# Patient Record
Sex: FEMALE | Race: WHITE | NOT HISPANIC OR LATINO | Employment: OTHER | URBAN - METROPOLITAN AREA
[De-identification: names, ages, dates, MRNs, and addresses within clinical notes are randomized per-mention and may not be internally consistent; named-entity substitution may affect disease eponyms.]

---

## 2017-12-01 ENCOUNTER — RESOLUTE PROFESSIONAL BILLING HOSPITAL PROF FEE (OUTPATIENT)
Dept: HOSPITALIST | Facility: MEDICAL CENTER | Age: 72
End: 2017-12-01
Payer: MEDICARE

## 2017-12-01 ENCOUNTER — HOSPITAL ENCOUNTER (OUTPATIENT)
Facility: MEDICAL CENTER | Age: 72
End: 2017-12-02
Attending: EMERGENCY MEDICINE | Admitting: HOSPITALIST
Payer: MEDICARE

## 2017-12-01 ENCOUNTER — APPOINTMENT (OUTPATIENT)
Dept: RADIOLOGY | Facility: MEDICAL CENTER | Age: 72
End: 2017-12-01
Attending: EMERGENCY MEDICINE
Payer: MEDICARE

## 2017-12-01 DIAGNOSIS — G45.8 OTHER SPECIFIED TRANSIENT CEREBRAL ISCHEMIAS: ICD-10-CM

## 2017-12-01 PROBLEM — G43.909 MIGRAINE: Status: ACTIVE | Noted: 2017-12-01

## 2017-12-01 PROBLEM — G45.9 TIA (TRANSIENT ISCHEMIC ATTACK): Status: ACTIVE | Noted: 2017-12-01

## 2017-12-01 LAB
ANION GAP SERPL CALC-SCNC: 10 MMOL/L (ref 0–11.9)
APTT PPP: 25 SEC (ref 24.7–36)
BASOPHILS # BLD AUTO: 0.6 % (ref 0–1.8)
BASOPHILS # BLD: 0.04 K/UL (ref 0–0.12)
BUN SERPL-MCNC: 19 MG/DL (ref 8–22)
CALCIUM SERPL-MCNC: 9.6 MG/DL (ref 8.4–10.2)
CHLORIDE SERPL-SCNC: 103 MMOL/L (ref 96–112)
CO2 SERPL-SCNC: 24 MMOL/L (ref 20–33)
CREAT SERPL-MCNC: 0.85 MG/DL (ref 0.5–1.4)
EKG IMPRESSION: NORMAL
EOSINOPHIL # BLD AUTO: 0.17 K/UL (ref 0–0.51)
EOSINOPHIL NFR BLD: 2.5 % (ref 0–6.9)
ERYTHROCYTE [DISTWIDTH] IN BLOOD BY AUTOMATED COUNT: 43.4 FL (ref 35.9–50)
ERYTHROCYTE [SEDIMENTATION RATE] IN BLOOD BY WESTERGREN METHOD: 3 MM/HOUR (ref 0–30)
EST. AVERAGE GLUCOSE BLD GHB EST-MCNC: 114 MG/DL
GFR SERPL CREATININE-BSD FRML MDRD: >60 ML/MIN/1.73 M 2
GLUCOSE SERPL-MCNC: 104 MG/DL (ref 65–99)
HBA1C MFR BLD: 5.6 % (ref 0–5.6)
HCT VFR BLD AUTO: 42.3 % (ref 37–47)
HGB BLD-MCNC: 14.3 G/DL (ref 12–16)
IMM GRANULOCYTES # BLD AUTO: 0.02 K/UL (ref 0–0.11)
IMM GRANULOCYTES NFR BLD AUTO: 0.3 % (ref 0–0.9)
INR PPP: 0.97 (ref 0.87–1.13)
LYMPHOCYTES # BLD AUTO: 2.28 K/UL (ref 1–4.8)
LYMPHOCYTES NFR BLD: 33 % (ref 22–41)
MCH RBC QN AUTO: 31.8 PG (ref 27–33)
MCHC RBC AUTO-ENTMCNC: 33.8 G/DL (ref 33.6–35)
MCV RBC AUTO: 94 FL (ref 81.4–97.8)
MONOCYTES # BLD AUTO: 0.6 K/UL (ref 0–0.85)
MONOCYTES NFR BLD AUTO: 8.7 % (ref 0–13.4)
NEUTROPHILS # BLD AUTO: 3.79 K/UL (ref 2–7.15)
NEUTROPHILS NFR BLD: 54.9 % (ref 44–72)
NRBC # BLD AUTO: 0 K/UL
NRBC BLD AUTO-RTO: 0 /100 WBC
PLATELET # BLD AUTO: 162 K/UL (ref 164–446)
PMV BLD AUTO: 10.6 FL (ref 9–12.9)
POTASSIUM SERPL-SCNC: 3.5 MMOL/L (ref 3.6–5.5)
PROTHROMBIN TIME: 12.5 SEC (ref 12–14.6)
RBC # BLD AUTO: 4.5 M/UL (ref 4.2–5.4)
SODIUM SERPL-SCNC: 137 MMOL/L (ref 135–145)
TSH SERPL DL<=0.005 MIU/L-ACNC: 1.95 UIU/ML (ref 0.35–5.5)
WBC # BLD AUTO: 6.9 K/UL (ref 4.8–10.8)

## 2017-12-01 PROCEDURE — 85610 PROTHROMBIN TIME: CPT

## 2017-12-01 PROCEDURE — 70450 CT HEAD/BRAIN W/O DYE: CPT

## 2017-12-01 PROCEDURE — 80048 BASIC METABOLIC PNL TOTAL CA: CPT

## 2017-12-01 PROCEDURE — 99218 PR INITIAL OBSERVATION CARE,LEVL I: CPT | Performed by: HOSPITALIST

## 2017-12-01 PROCEDURE — 85025 COMPLETE CBC W/AUTO DIFF WBC: CPT

## 2017-12-01 PROCEDURE — 85652 RBC SED RATE AUTOMATED: CPT

## 2017-12-01 PROCEDURE — 93005 ELECTROCARDIOGRAM TRACING: CPT

## 2017-12-01 PROCEDURE — 84443 ASSAY THYROID STIM HORMONE: CPT

## 2017-12-01 PROCEDURE — 85730 THROMBOPLASTIN TIME PARTIAL: CPT

## 2017-12-01 PROCEDURE — 700102 HCHG RX REV CODE 250 W/ 637 OVERRIDE(OP): Performed by: HOSPITALIST

## 2017-12-01 PROCEDURE — G0378 HOSPITAL OBSERVATION PER HR: HCPCS

## 2017-12-01 PROCEDURE — A9270 NON-COVERED ITEM OR SERVICE: HCPCS | Performed by: HOSPITALIST

## 2017-12-01 PROCEDURE — 36415 COLL VENOUS BLD VENIPUNCTURE: CPT

## 2017-12-01 PROCEDURE — 99285 EMERGENCY DEPT VISIT HI MDM: CPT

## 2017-12-01 PROCEDURE — 700111 HCHG RX REV CODE 636 W/ 250 OVERRIDE (IP): Performed by: HOSPITALIST

## 2017-12-01 PROCEDURE — 83036 HEMOGLOBIN GLYCOSYLATED A1C: CPT

## 2017-12-01 PROCEDURE — 700105 HCHG RX REV CODE 258: Performed by: HOSPITALIST

## 2017-12-01 RX ORDER — OXYCODONE HYDROCHLORIDE 5 MG/1
2.5 TABLET ORAL
Status: DISCONTINUED | OUTPATIENT
Start: 2017-12-01 | End: 2017-12-02 | Stop reason: HOSPADM

## 2017-12-01 RX ORDER — ONDANSETRON 4 MG/1
4 TABLET, ORALLY DISINTEGRATING ORAL EVERY 4 HOURS PRN
Status: DISCONTINUED | OUTPATIENT
Start: 2017-12-01 | End: 2017-12-02 | Stop reason: HOSPADM

## 2017-12-01 RX ORDER — LEVOTHYROXINE SODIUM 0.12 MG/1
125 TABLET ORAL
Status: DISCONTINUED | OUTPATIENT
Start: 2017-12-02 | End: 2017-12-02 | Stop reason: HOSPADM

## 2017-12-01 RX ORDER — ASPIRIN 81 MG/1
324 TABLET, CHEWABLE ORAL DAILY
Status: DISCONTINUED | OUTPATIENT
Start: 2017-12-01 | End: 2017-12-02 | Stop reason: HOSPADM

## 2017-12-01 RX ORDER — POLYETHYLENE GLYCOL 3350 17 G/17G
1 POWDER, FOR SOLUTION ORAL
Status: DISCONTINUED | OUTPATIENT
Start: 2017-12-01 | End: 2017-12-02 | Stop reason: HOSPADM

## 2017-12-01 RX ORDER — SUMATRIPTAN 25 MG/1
100 TABLET, FILM COATED ORAL
Status: COMPLETED | OUTPATIENT
Start: 2017-12-01 | End: 2017-12-02

## 2017-12-01 RX ORDER — BISACODYL 10 MG
10 SUPPOSITORY, RECTAL RECTAL
Status: DISCONTINUED | OUTPATIENT
Start: 2017-12-01 | End: 2017-12-02 | Stop reason: HOSPADM

## 2017-12-01 RX ORDER — ASPIRIN 600 MG/1
300 SUPPOSITORY RECTAL DAILY
Status: DISCONTINUED | OUTPATIENT
Start: 2017-12-01 | End: 2017-12-02 | Stop reason: HOSPADM

## 2017-12-01 RX ORDER — AMOXICILLIN 250 MG
2 CAPSULE ORAL 2 TIMES DAILY
Status: DISCONTINUED | OUTPATIENT
Start: 2017-12-01 | End: 2017-12-02 | Stop reason: HOSPADM

## 2017-12-01 RX ORDER — OXYCODONE HYDROCHLORIDE 5 MG/1
5 TABLET ORAL
Status: DISCONTINUED | OUTPATIENT
Start: 2017-12-01 | End: 2017-12-02 | Stop reason: HOSPADM

## 2017-12-01 RX ORDER — LEVOTHYROXINE SODIUM 0.12 MG/1
125 TABLET ORAL
COMMUNITY

## 2017-12-01 RX ORDER — ONDANSETRON 2 MG/ML
4 INJECTION INTRAMUSCULAR; INTRAVENOUS EVERY 4 HOURS PRN
Status: DISCONTINUED | OUTPATIENT
Start: 2017-12-01 | End: 2017-12-02 | Stop reason: HOSPADM

## 2017-12-01 RX ORDER — ACETAMINOPHEN 325 MG/1
650 TABLET ORAL EVERY 6 HOURS PRN
Status: DISCONTINUED | OUTPATIENT
Start: 2017-12-01 | End: 2017-12-02 | Stop reason: HOSPADM

## 2017-12-01 RX ORDER — SODIUM CHLORIDE 9 MG/ML
INJECTION, SOLUTION INTRAVENOUS CONTINUOUS
Status: DISCONTINUED | OUTPATIENT
Start: 2017-12-01 | End: 2017-12-02 | Stop reason: HOSPADM

## 2017-12-01 RX ORDER — ASPIRIN 325 MG
325 TABLET ORAL DAILY
Status: DISCONTINUED | OUTPATIENT
Start: 2017-12-01 | End: 2017-12-02 | Stop reason: HOSPADM

## 2017-12-01 RX ADMIN — ASPIRIN 325 MG ORAL TABLET 325 MG: 325 PILL ORAL at 18:25

## 2017-12-01 RX ADMIN — SODIUM CHLORIDE: 9 INJECTION, SOLUTION INTRAVENOUS at 18:26

## 2017-12-01 RX ADMIN — DOCUSATE SODIUM AND SENNOSIDES 2 TABLET: 8.6; 5 TABLET, FILM COATED ORAL at 20:18

## 2017-12-01 RX ADMIN — ENOXAPARIN SODIUM 40 MG: 100 INJECTION SUBCUTANEOUS at 18:25

## 2017-12-01 ASSESSMENT — PATIENT HEALTH QUESTIONNAIRE - PHQ9
SUM OF ALL RESPONSES TO PHQ QUESTIONS 1-9: 0
SUM OF ALL RESPONSES TO PHQ9 QUESTIONS 1 AND 2: 0
1. LITTLE INTEREST OR PLEASURE IN DOING THINGS: NOT AT ALL
2. FEELING DOWN, DEPRESSED, IRRITABLE, OR HOPELESS: NOT AT ALL

## 2017-12-01 ASSESSMENT — LIFESTYLE VARIABLES
ALCOHOL_USE: NO
EVER_SMOKED: NEVER

## 2017-12-01 ASSESSMENT — PAIN SCALES - GENERAL
PAINLEVEL_OUTOF10: 0
PAINLEVEL_OUTOF10: 0

## 2017-12-01 NOTE — ED PROVIDER NOTES
ED Provider Note    CHIEF COMPLAINT  Chief Complaint   Patient presents with   • Blurred Vision     X2 within last 30 min       HPI  Jovita Chaves is a 72 y.o. female who presents complaining of difficulty with her vision. This has happened twice today. Each episode lasted less than 10 minutes. Revision was doubled and altered. She denied headache. She does have a history of migraines but no complex migraines. She denies difficulty with her speech, ambulation, her arm and leg weakness.    REVIEW OF SYSTEMS  See HPI for further details. No fever or chills. No headache. No neck pain. All other systems are negative.    PAST MEDICAL HISTORY  Past Medical History:   Diagnosis Date   • Arthritis     lower back   • Cervicalgia    • Hypothyroid    • Migraine without aura, with intractable migraine, so stated, without mention of status migrainosus    • Obesity    • Pain     lower back 8/10   • Thoracic or lumbosacral neuritis or radiculitis, unspecified     Bilateral L4/5   • Unspecified disorder of thyroid        FAMILY HISTORY  Family History   Problem Relation Age of Onset   • Cancer Mother      Colon   • Other Father      Migraine   • Thyroid Mother      Hypothyroid     No history of stroke      SOCIAL HISTORY  Social History     Social History   • Marital status:      Spouse name: N/A   • Number of children: N/A   • Years of education: N/A     Social History Main Topics   • Smoking status: Never Smoker   • Smokeless tobacco: Not on file   • Alcohol use No   • Drug use: No   • Sexual activity: Not on file     Other Topics Concern   • Not on file     Social History Narrative   • No narrative on file       SURGICAL HISTORY  Past Surgical History:   Procedure Laterality Date   • IRRIGATION & DEBRIDEMENT NEURO  11/8/2010    Performed by JANET FELIX at SURGERY [facility] ORS   • LUMBAR LAMINECTOMY DISKECTOMY  9/27/2010    Performed by JANET FELIX at SURGERY [facility] ORS   • LAMINOTOMY  9/27/2010  "   Performed by JANET FELIX at SURGERY [facility] ORS   • OTHER  2010    epidural injections   • OTHER  2009    left ankle debridement   • OTHER  2006    right foot gusman osteotomy   • OTHER  2005    tendon transfer right foot   • CHOLECYSTECTOMY  1983   • CHOLECYSTECTOMY  1983   • OTHER  1982    left knee repair   • OTHER  1951    hysterectomy   • OTHER ORTHOPEDIC SURGERY      Left knee, right foot tendon, Left ankle debridement   • ROTATOR CUFF REPAIR  [id number]    rotator cuff, brachioplexus nerve       CURRENT MEDICATIONS  Home Medications     Reviewed by Jacqueline Ortega (Pharmacy Tech) on 12/01/17 at 1543  Med List Status: Complete   Medication Last Dose Status   aspirin EC (ECOTRIN) 81 MG Tablet Delayed Response 12/1/2017 Active   Cholecalciferol (VITAMIN D3) 5000 units Cap 12/1/2017 Active   Cyanocobalamin ([employer] VITAMIN B12) 2000 MCG Tab 12/1/2017 Active   levothyroxine (SYNTHROID) 125 MCG Tab 12/1/2017 Active   Omega-3 Fatty Acids (RA FISH OIL) 1400 MG CAPSULE DELAYED RELEASE 12/1/2017 Active   sumatriptan (IMITREX) 100 MG tablet 11/30/2017 Active                ALLERGIES  No Known Allergies    PHYSICAL EXAM  VITAL SIGNS: /75   Pulse (!) 103   Temp 36.2 °C (97.2 °F)   Resp 18   Ht 1.727 m (5' 8\")   Wt 105.4 kg (232 lb 5.8 oz)   SpO2 96%   BMI 35.33 kg/m²   Constitutional: Well developed, Well nourished, No acute distress, Non-toxic appearance.   HENT:Number Stock atraumatic. Neck is supple. No bruits  Eyes: Pupils equal reactive to light extraocular movements are intact  Neck: Normal range of motion, No tenderness, Supple, No stridor.   Cardiovascular: Normal heart rate, Normal rhythm, No murmurs, No rubs, No gallops.   Thorax & Lungs: Normal breath sounds, No respiratory distress, No wheezing, No chest tenderness.   Abdomen: Bowel sounds normal, Soft, No tenderness, No masses, No pulsatile masses.   Skin: Warm, Dry, No erythema, No rash.   Back: No tenderness, No CVA tenderness. "   Extremities: No edema, No tenderness, No cyanosis, No clubbing. Dorsalis pedis pulses 2+ equal bilaterally. Radial pulses 2+ equal bilaterally   Neurologic: Cranial nerves II-12 intact. Normal strength and sensation. Normal gait. Deep tendon reflexes are intact  Psychiatric: Affect normal, Judgment normal, Mood normal.     EKG  Sinus rhythm. Rate of 95. Normal P waves. Abnormal QRS with a incomplete right bundle branch block and a left anterior fascicular block. Normal ST segments. Normal T waves. Abnormal EKG showing a bifascicular block.    RADIOLOGY/PROCEDURES  CT-HEAD W/O   Final Result         1. No acute intracranial abnormality. No evidence of acute intracranial hemorrhage or mass lesion.                     Results for orders placed or performed during the hospital encounter of 12/01/17   CBC WITH DIFFERENTIAL   Result Value Ref Range    WBC 6.9 4.8 - 10.8 K/uL    RBC 4.50 4.20 - 5.40 M/uL    Hemoglobin 14.3 12.0 - 16.0 g/dL    Hematocrit 42.3 37.0 - 47.0 %    MCV 94.0 81.4 - 97.8 fL    MCH 31.8 27.0 - 33.0 pg    MCHC 33.8 33.6 - 35.0 g/dL    RDW 43.4 35.9 - 50.0 fL    Platelet Count 162 (L) 164 - 446 K/uL    MPV 10.6 9.0 - 12.9 fL    Neutrophils-Polys 54.90 44.00 - 72.00 %    Lymphocytes 33.00 22.00 - 41.00 %    Monocytes 8.70 0.00 - 13.40 %    Eosinophils 2.50 0.00 - 6.90 %    Basophils 0.60 0.00 - 1.80 %    Immature Granulocytes 0.30 0.00 - 0.90 %    Nucleated RBC 0.00 /100 WBC    Neutrophils (Absolute) 3.79 2.00 - 7.15 K/uL    Lymphs (Absolute) 2.28 1.00 - 4.80 K/uL    Monos (Absolute) 0.60 0.00 - 0.85 K/uL    Eos (Absolute) 0.17 0.00 - 0.51 K/uL    Baso (Absolute) 0.04 0.00 - 0.12 K/uL    Immature Granulocytes (abs) 0.02 0.00 - 0.11 K/uL    NRBC (Absolute) 0.00 K/uL   BASIC METABOLIC PANEL   Result Value Ref Range    Sodium 137 135 - 145 mmol/L    Potassium 3.5 (L) 3.6 - 5.5 mmol/L    Chloride 103 96 - 112 mmol/L    Co2 24 20 - 33 mmol/L    Glucose 104 (H) 65 - 99 mg/dL    Bun 19 8 - 22 mg/dL     Creatinine 0.85 0.50 - 1.40 mg/dL    Calcium 9.6 8.4 - 10.2 mg/dL    Anion Gap 10.0 0.0 - 11.9   PROTHROMBIN TIME   Result Value Ref Range    PT 12.5 12.0 - 14.6 sec    INR 0.97 0.87 - 1.13   APTT   Result Value Ref Range    APTT 25.0 24.7 - 36.0 sec   ESTIMATED GFR   Result Value Ref Range    GFR If African American >60 >60 mL/min/1.73 m 2    GFR If Non African American >60 >60 mL/min/1.73 m 2   EKG (NOW)   Result Value Ref Range    Report       [facility] Emergency Dept.    Test Date:  2017  Pt Name:    JOSSY PARTIDA             Department: [facility]  MRN:        [id number]                      [facility]  Gender:     F                            Technician: SPENCER  :        [id number]                   Requested By:ER TRIAGE PROTOCOL  Order #:    [id number]                    Reading MD:    Measurements  Intervals                                Axis  Rate:       96                           P:          68  AR:         200                          QRS:        -51  QRSD:       82                           T:          50  QT:         368  QTc:        465    Interpretive Statements  SINUS RHYTHM  LEFT ANTERIOR FASCICULAR BLOCK  RSR' IN V1 OR V2, RIGHT VCD OR RVH  Compared to ECG 2010 13:38:39  Left anterior fascicular block now present           COURSE & MEDICAL DECISION MAKING  Pertinent Labs & Imaging studies reviewed. (See chart for details)  Differential diagnosis includes complex migraines versus TIA versus brain tumor. Patient is currently asymptomatic. CT scan was unremarkable for hemorrhagic stroke. I am concerned the patient could have a posterior circulation TIA.    FINAL IMPRESSION  1. TIA  2.   3.          Electronically signed by: Tomer Marti, 2017 3:53 PM

## 2017-12-01 NOTE — ED NOTES
"Patient reports episode of blurred vision X2 within last 30 minutes, currently denies blurred vision. Patient reports \"fuzziness in head\" and \"something is wrong\". EKG called in triage.   "

## 2017-12-02 ENCOUNTER — APPOINTMENT (OUTPATIENT)
Dept: RADIOLOGY | Facility: MEDICAL CENTER | Age: 72
End: 2017-12-02
Attending: HOSPITALIST
Payer: MEDICARE

## 2017-12-02 VITALS
TEMPERATURE: 98 F | HEART RATE: 93 BPM | WEIGHT: 232.37 LBS | RESPIRATION RATE: 16 BRPM | HEIGHT: 68 IN | DIASTOLIC BLOOD PRESSURE: 66 MMHG | BODY MASS INDEX: 35.22 KG/M2 | SYSTOLIC BLOOD PRESSURE: 131 MMHG | OXYGEN SATURATION: 100 %

## 2017-12-02 LAB
CHOLEST SERPL-MCNC: 220 MG/DL (ref 100–199)
HDLC SERPL-MCNC: 67 MG/DL
LDLC SERPL CALC-MCNC: 135 MG/DL
LV EJECT FRACT  99904: 60
LV EJECT FRACT MOD 2C 99903: 52.94
LV EJECT FRACT MOD 4C 99902: 69.29
LV EJECT FRACT MOD BP 99901: 59
TRIGL SERPL-MCNC: 88 MG/DL (ref 0–149)

## 2017-12-02 PROCEDURE — 80061 LIPID PANEL: CPT

## 2017-12-02 PROCEDURE — 700111 HCHG RX REV CODE 636 W/ 250 OVERRIDE (IP): Performed by: HOSPITALIST

## 2017-12-02 PROCEDURE — 99217 PR OBSERVATION CARE DISCHARGE: CPT | Performed by: HOSPITALIST

## 2017-12-02 PROCEDURE — 97161 PT EVAL LOW COMPLEX 20 MIN: CPT

## 2017-12-02 PROCEDURE — G8988 SELF CARE GOAL STATUS: HCPCS | Mod: CH

## 2017-12-02 PROCEDURE — G8979 MOBILITY GOAL STATUS: HCPCS | Mod: CH

## 2017-12-02 PROCEDURE — 93880 EXTRACRANIAL BILAT STUDY: CPT

## 2017-12-02 PROCEDURE — 70551 MRI BRAIN STEM W/O DYE: CPT

## 2017-12-02 PROCEDURE — A9270 NON-COVERED ITEM OR SERVICE: HCPCS | Performed by: HOSPITALIST

## 2017-12-02 PROCEDURE — 99285 EMERGENCY DEPT VISIT HI MDM: CPT

## 2017-12-02 PROCEDURE — G0378 HOSPITAL OBSERVATION PER HR: HCPCS

## 2017-12-02 PROCEDURE — 700102 HCHG RX REV CODE 250 W/ 637 OVERRIDE(OP): Performed by: HOSPITALIST

## 2017-12-02 PROCEDURE — 93306 TTE W/DOPPLER COMPLETE: CPT

## 2017-12-02 PROCEDURE — G8980 MOBILITY D/C STATUS: HCPCS | Mod: CH

## 2017-12-02 PROCEDURE — 93306 TTE W/DOPPLER COMPLETE: CPT | Mod: 26 | Performed by: INTERNAL MEDICINE

## 2017-12-02 PROCEDURE — G8989 SELF CARE D/C STATUS: HCPCS | Mod: CH

## 2017-12-02 PROCEDURE — G8987 SELF CARE CURRENT STATUS: HCPCS | Mod: CH

## 2017-12-02 PROCEDURE — G8978 MOBILITY CURRENT STATUS: HCPCS | Mod: CH

## 2017-12-02 RX ORDER — ASPIRIN 325 MG
325 TABLET, DELAYED RELEASE (ENTERIC COATED) ORAL DAILY
Qty: 100 TAB | Refills: 3 | Status: SHIPPED | OUTPATIENT
Start: 2017-12-02 | End: 2021-05-24

## 2017-12-02 RX ORDER — CHLORAL HYDRATE 500 MG
1000 CAPSULE ORAL
Status: DISCONTINUED | OUTPATIENT
Start: 2017-12-02 | End: 2017-12-02 | Stop reason: HOSPADM

## 2017-12-02 RX ORDER — ATORVASTATIN CALCIUM 40 MG/1
40 TABLET, FILM COATED ORAL
Status: DISCONTINUED | OUTPATIENT
Start: 2017-12-02 | End: 2017-12-02 | Stop reason: HOSPADM

## 2017-12-02 RX ORDER — POTASSIUM CHLORIDE 20 MEQ/1
20 TABLET, EXTENDED RELEASE ORAL DAILY
Status: DISCONTINUED | OUTPATIENT
Start: 2017-12-02 | End: 2017-12-02 | Stop reason: HOSPADM

## 2017-12-02 RX ORDER — ATORVASTATIN CALCIUM 40 MG/1
40 TABLET, FILM COATED ORAL
Qty: 30 TAB | Refills: 3 | Status: ON HOLD | OUTPATIENT
Start: 2017-12-02 | End: 2021-01-04

## 2017-12-02 RX ADMIN — ENOXAPARIN SODIUM 40 MG: 100 INJECTION SUBCUTANEOUS at 08:03

## 2017-12-02 RX ADMIN — SUMATRIPTAN SUCCINATE 100 MG: 25 TABLET ORAL at 16:17

## 2017-12-02 RX ADMIN — ASPIRIN 325 MG ORAL TABLET 325 MG: 325 PILL ORAL at 08:04

## 2017-12-02 RX ADMIN — POTASSIUM CHLORIDE 20 MEQ: 1500 TABLET, EXTENDED RELEASE ORAL at 10:15

## 2017-12-02 RX ADMIN — Medication 1000 MG: at 16:57

## 2017-12-02 RX ADMIN — LEVOTHYROXINE SODIUM 125 MCG: 125 TABLET ORAL at 06:05

## 2017-12-02 RX ADMIN — DOCUSATE SODIUM AND SENNOSIDES 2 TABLET: 8.6; 5 TABLET, FILM COATED ORAL at 08:03

## 2017-12-02 RX ADMIN — Medication 1000 MG: at 10:21

## 2017-12-02 ASSESSMENT — PAIN SCALES - GENERAL: PAINLEVEL_OUTOF10: 6

## 2017-12-02 ASSESSMENT — GAIT ASSESSMENTS
DISTANCE (FEET): 200
GAIT LEVEL OF ASSIST: SUPERVISED

## 2017-12-02 ASSESSMENT — PATIENT HEALTH QUESTIONNAIRE - PHQ9
SUM OF ALL RESPONSES TO PHQ9 QUESTIONS 1 AND 2: 0
SUM OF ALL RESPONSES TO PHQ QUESTIONS 1-9: 0
2. FEELING DOWN, DEPRESSED, IRRITABLE, OR HOPELESS: NOT AT ALL
1. LITTLE INTEREST OR PLEASURE IN DOING THINGS: NOT AT ALL

## 2017-12-02 ASSESSMENT — COGNITIVE AND FUNCTIONAL STATUS - GENERAL
MOBILITY SCORE: 24
SUGGESTED CMS G CODE MODIFIER MOBILITY: CH

## 2017-12-02 ASSESSMENT — ACTIVITIES OF DAILY LIVING (ADL): TOILETING: INDEPENDENT

## 2017-12-02 NOTE — PROGRESS NOTES
Pt complains of 4/10 headache on the LT side. Pt states this is a common/typical thing for her. No intervention wanted.

## 2017-12-02 NOTE — DISCHARGE PLANNING
Medical Social Work    Referral: ANUM reviewed the chart this AM.      Intervention: Per flowsheet, pt lives with spouse and expects to d.c home.  No SS or DC needs at this time.      Plan: ANUM Bernardo available to assist with any d.c planning.    Care Transition Team Assessment    Information Source  Orientation : Oriented x 4  Information Given By: Patient    Readmission Evaluation  Is this a readmission?: No    Elopement Risk  Legal Hold: No  Ambulatory or Self Mobile in Wheelchair: Yes  Disoriented: No  Psychiatric Symptoms: None  History of Wandering: No  Elopement this Admit: No  Vocalizing Wanting to Leave: No  Displays Behaviors, Body Language Wanting to Leave: No-Not at Risk for Elopement  Elopement Risk: Not at Risk for Elopement    Interdisciplinary Discharge Planning  Does Admitting Nurse Feel This Could be a Complex Discharge?: No  Primary Care Physician: bishop Goodwin  Lives with - Patient's Self Care Capacity: Spouse  Patient or legal guardian wants to designate a caregiver (see row info): No  Support Systems: Spouse / Significant Other  Housing / Facility: 1 Story Apartment / Condo  Do You Take your Prescribed Medications Regularly: Yes  Able to Return to Previous ADL's: Yes  Mobility Issues: No  Patient Expects to be Discharged to:: home    Discharge Preparedness  What is your plan after discharge?: Home with help  What are your discharge supports?: Spouse              Vision / Hearing Impairment  Vision Impairment : Yes  Right Eye Vision: Wears Glasses  Left Eye Vision: Wears Glasses  Hearing Impairment : No    Values / Beliefs / Concerns  Values / Beliefs Concerns : No    Advance Directive  Advance Directive?: None    Domestic Abuse  Have you ever been the victim of abuse or violence?: No  Physical Abuse or Sexual Abuse: No  Verbal Abuse or Emotional Abuse: No    Psychological Assessment  History of Substance Abuse: None

## 2017-12-02 NOTE — CARE PLAN
Problem: Safety  Goal: Will remain free from injury  Outcome: PROGRESSING AS EXPECTED  Pt notified to call for assistance. Pt will update RN of any new symptoms.     Problem: Pain Management  Goal: Pain level will decrease to patient's comfort goal  Outcome: PROGRESSING AS EXPECTED  Pt complains of headache, 3/10 pain. Pt notified to inform RN of any pain greater than comfort goal.

## 2017-12-02 NOTE — PROGRESS NOTES
Received report from NOC RN; assumed pt care. Pt A&Ox4, resting comfortably in bed. Pt denies pain at this time. Pt notified to call for assistance, call light within reach, bed in lowest position.

## 2017-12-02 NOTE — THERAPY
"Physical Therapy Evaluation completed.   Bed Mobility:  Supine to Sit: Independent  Transfers: Sit to Stand: Independent  Gait: Level Of Assist: Supervised with No Equipment Needed       Plan of Care: Patient with no further skilled PT needs in the acute care setting at this time  Discharge Recommendations: Equipment: No Equipment Needed. Post-acute therapy Currently anticipate no further skilled therapy needs once patient is discharged from the inpatient setting.    See \"Rehab Therapy-Acute\" Patient Summary Report for complete documentation.   pt moving well and reports she is at her baseline. pt has preexisting R LE weakness from low back issues and migraines. pt had no neuro deficits during session. pt feels she can go home and do all necessary mobiltiy. Discussed risk factors for CVA and need to follow up w/ PCP regarding this episode. pt has does a very good job over the last 20 years modifying her diet, track her migraines to see if there is any pattern, which pt reports there has not been any pattern. no further need for skilled at this time. pt to continue her OPPT as needed.   "

## 2017-12-02 NOTE — PROGRESS NOTES
IV to right AC hurting patient. Attempted to reinsert with no success X2. Pt. States that she does not want  IV because she knows that she is a hard stick and she does not want IV fluids because she is drinking. Will notify Md. Will continue to monitor

## 2017-12-02 NOTE — PROGRESS NOTES
[facility]  Discussed POC.  Pt resting in bed, semi-fowlers, no s/s of acute distress, all lines patent, IVF infusing, denies any immediate needs, personal belongings w/in reach safety precautions in place.

## 2017-12-02 NOTE — PROGRESS NOTES
Bedside report given to Alla. Pt. Stable, no acute distress noted, no complaints of pain and respirations even and unlabored. Safety precautions maintained, call light within reach, bed in lowest position.

## 2017-12-02 NOTE — H&P
DATE OF ADMISSION:  12/01/2017.    IDENTIFICATION:  This is a 72-year-old female.    PRIMARY CARE PHYSICIAN:  Dr. Starks in [place].    CHIEF COMPLAINT:  Visual changes.    HISTORY OF PRESENT ILLNESS:  This is a 72-year-old female with a history of   migraine headaches and some back pain, hypothyroidism.  She felt in her usual   state of health up until earlier today when she was trying to cook.  She tried   to set a timer on her phone, suddenly she saw multiple phones, multiple timer   images that were stacked onto each other.  She was trying to shake her head   and make it go away, but it would not.  She eventually got better and moved   down on her tasks and then she had another episode where she was in the   kitchen looking at the clock and she saw 6 clocks next to each other.  She   felt somewhat disoriented, denies any other associated symptoms, such as   weakness, headache or other concerns.  She did not feel dizzy or passing out.    She had a migraine headache yesterday which she treated with Imitrex.  The   patient is currently undergoing physical therapy because she has some right   lower extremity muscle loss where she is undergoing treatment, but the   diagnosis is unclear.  She does have occasional episodes of what sounds like   PSVT that she can terminate by herself with deep breathing and relaxing.  The   patient has had increasing frequency of migraines over the last month or so   and she was undergoing some physical therapy with neck massages that have   helped some.  The patient is under the care of her primary care physician and   does not have a neurologist.  The patient currently has no further symptoms   and will be admitted for further workup.    ALLERGIES:  None.    PRESCRIPTION MEDICATIONS:  As follows:  1.  Aspirin 81 mg daily.  2.  Vitamin D3 5000 units daily.  3.  Vitamin B12 2000 mcg daily.  4.  Synthroid 125 mcg daily.  5.  Omega-3 fatty acid 1 capsule daily.  6.  Sumatriptan 100  mg p.o. p.r.n. headache.    PAST MEDICAL HISTORY:  1.  Osteoarthritis.  2.  Hypothyroidism.  3.  History of migraines since age 32.  4.  Obesity.  5.  History of L4-L5 back syndrome.  6.  Question of PSVT.    PAST SURGICAL HISTORY:  1.  History of right total knee arthroplasty.  2.  History of hysterectomy.  3.  Cholecystectomy.  4.  History of right shoulder repair.  5.  History of three foot surgeries.  6.  History of foraminotomy in her L-spine, which was followed by staph   infection.    SOCIAL HISTORY:  The patient is a nonsmoker, nondrinker.  She denies drug use.    She has her .    FAMILY HISTORY:  Positive for cancer and migraines.    LABORATORY DATA AND IMAGING:  Please refer to the Epic system for further   details.  In brief, the patient has a normal CBC, fairly normal chemistry   except potassium slightly low at 3.5.  Her CT of the head is negative and an   EKG shows here no arrhythmia.  She does have a sinus rhythm at a rate of 96   without left anterior fascicular block.    ASSESSMENT AND PLAN:  1.  Visual disturbances which was recurrent, but short lived; certainly   possibilities of transient ischemic attack versus migraine variant.  Patient   at this time will be admitted and worked up in the form of a transient   ischemic attack workup, including an echocardiogram, MRI, carotid ultrasound,   and tele monitoring.  The patient is already on statin and on aspirin.  We   will continue as such at the current dosing until we have more information.  2.  Migraine headaches with increased frequency recently, I would recommend   the patient will be seen as an outpatient with neurology to address that as   well as her muscular atrophy that she is reporting  3.  Muscular atrophy, unknown source, certainly could be radicular compromise   from her prior back syndrome.  4.  Obesity.  5.  Question of paroxysmal supraventricular tachycardia.  6.  Hypothyroidism.    OVERALL PLAN:  The patient is admitted with  visual disturbance that has now   resolved, was short lived with recurrence.  Certainly concerning for possible   transient ischemic attack versus other I doubt that the patient has a temporal   arteritis.  Her symptoms have fully resolved.  She is nontender.  We will   check a sed rate regardless to make sure the patient will be otherwise treated   conservatively and followed once more results are available.    Time spent on admission 45 minutes.       ____________________________________     MD JSO THOMSON / AMAN    DD:  12/01/2017 17:25:35  DT:  12/01/2017 21:34:31    D#:  [id number]  Job#:  [id number]

## 2017-12-02 NOTE — THERAPY
"Occupational Therapy Evaluation completed.   Functional Status:  Pt currently I with grooming while standing at sink, I bed mobility, I sit to stand transfer, I ambulation without AE. Pt presents at baseline functioning for ADLs and functional mobility.   Plan of Care: Patient with no further skilled OT needs in the acute care setting at this time. Pt lives at home with spouse with good support.   Discharge Recommendations:  Equipment: No Equipment Needed. Post-acute therapy Currently anticipate no further skilled therapy needs once patient is discharged from the inpatient setting.    See \"Rehab Therapy-Acute\" Patient Summary Report for complete documentation.    "

## 2017-12-02 NOTE — PROGRESS NOTES
Bedside handoff received from Alyssa. Pt. Stable, no acute distress noted, no complaints of pain,respirations even and unlabored. Safety precautions maintained, call light within reach, bed in lowest position. Will continue to monitor.

## 2017-12-03 NOTE — DISCHARGE SUMMARY
CHIEF COMPLAINT ON ADMISSION  Chief Complaint   Patient presents with   • Blurred Vision     X2 within last 30 min       CODE STATUS  Full Code    HPI & HOSPITAL COURSE  This is a 72 y.o. female admitted 12/1/17 with acute onset of vertical double vision lasting ~ 30 minutes.  The patient has a history of migraine headaches with current headache.  She states however that she did not have tunnel vision or wavy lines in the periphery.  She saw her phone that looked that it had several stacked on top of each other.  She had a negative CT head and negative MRI brain w/o contrast.  Her symptoms did not recur.  No change in vision with testing one eye covered at a time.  She remained in NSR 70-80.  Echo EF 60%, carotid u/s with plaque, but no appreciable stenosis.  CBC, CMP wnl. She takes asa 81mg po daily ever since knee surgery x 3 years.  Her cholesterol was elevated and therefore she was started on lipitor, doubled her fish oil and increased ASA to 325mg po daily.  She has no bleeding risk.  Her blood pressure remained in good control.  PT/OT felt patient safe for dc home, no needs.     Therefore, she is discharged in good and stable condition with close outpatient follow-up.    SPECIFIC OUTPATIENT FOLLOW-UP  Follow up with PCP in 2 weeks.    DISCHARGE PROBLEM LIST  Active Problems:    Hypothyroid POA: Yes    TIA (transient ischemic attack) POA: Yes    Migraine POA: Yes  Resolved Problems:    * No resolved hospital problems. *      FOLLOW UP  No future appointments.  No follow-up provider specified.    MEDICATIONS ON DISCHARGE   Jovita Chaves   Home Medication Instructions FRITZ:[id number]    Printed on:12/02/17 5222   Medication Information                      aspirin  MG Tablet Delayed Response  Take 1 Tab by mouth every day.             atorvastatin (LIPITOR) 40 MG Tab  Take 1 Tab by mouth every bedtime.             Cholecalciferol (VITAMIN D3) 5000 units Cap  Take 1 Cap by mouth every day.              Cyanocobalamin ([employer] VITAMIN B12) 2000 MCG Tab  Take 1 Tab by mouth every day.             levothyroxine (SYNTHROID) 125 MCG Tab  Take 125 mcg by mouth Every morning on an empty stomach.             Omega-3 Fatty Acids (RA FISH OIL) 1400 MG CAPSULE DELAYED RELEASE  Take 2 Caps by mouth every day.             sumatriptan (IMITREX) 100 MG tablet  Take 1 Tab by mouth. 1 tab at headache onset; repeat in 1 hour prn.                 DIET  Orders Placed This Encounter   Procedures   • Diet Order     Standing Status:   Standing     Number of Occurrences:   1     Order Specific Question:   Diet:     Answer:   Cardiac [6]       ACTIVITY  As tolerated.  Weight bearing as tolerated      CONSULTATIONS  none    PROCEDURES      MRI OF THE BRAIN WITHOUT CONTRAST WITHIN NORMAL LIMITS.   Reading Provider Reading Date   Adin Irizarry M.D. Dec 2, 2017       LABORATORY  Lab Results   Component Value Date/Time    SODIUM 137 12/01/2017 03:08 PM    POTASSIUM 3.5 (L) 12/01/2017 03:08 PM    CHLORIDE 103 12/01/2017 03:08 PM    CO2 24 12/01/2017 03:08 PM    GLUCOSE 104 (H) 12/01/2017 03:08 PM    BUN 19 12/01/2017 03:08 PM    CREATININE 0.85 12/01/2017 03:08 PM        Lab Results   Component Value Date/Time    WBC 6.9 12/01/2017 03:08 PM    HEMOGLOBIN 14.3 12/01/2017 03:08 PM    HEMATOCRIT 42.3 12/01/2017 03:08 PM    PLATELETCT 162 (L) 12/01/2017 03:08 PM        Total time of the discharge process exceeds 40 minutes

## 2017-12-03 NOTE — PROGRESS NOTES
Discharging pt home per MD order. Discussed discharge instructions, follow up appointments, prescriptions, and home care. Pt voiding and ambulating without difficulty, pain controlled, tolerating diet. Family at bedside, all questions answered. Pt discharged off unit with family at 1756.

## 2017-12-03 NOTE — PROGRESS NOTES
Tele Summary @ 4806    Rhythm : Sinus Rhythm  Ectopy : none  HR : 90  AR : 0.16  QRS : 0.08  QT : 0.36    Any further monitoring was done by patient Primary Nurse.

## 2017-12-03 NOTE — DISCHARGE INSTRUCTIONS
Discharge Instructions    Discharged to home by car with relative. Discharged via walking, hospital escort: Refused.  Special equipment needed: Not Applicable    Be sure to schedule a follow-up appointment with your primary care doctor or any specialists as instructed.     Discharge Plan:   Influenza Vaccine Indication: Not indicated: Previously immunized this influenza season and > 8 years of age    I understand that a diet low in cholesterol, fat, and sodium is recommended for good health. Unless I have been given specific instructions below for another diet, I accept this instruction as my diet prescription.   Other diet: Regular/Cardiac    Special Instructions: None    · Is patient discharged on Warfarin / Coumadin?   No     · Is patient Post Blood Transfusion?  No    Depression / Suicide Risk    As you are discharged from this [facility] facility, it is important to learn how to keep safe from harming yourself.    Recognize the warning signs:  · Abrupt changes in personality, positive or negative- including increase in energy   · Giving away possessions  · Change in eating patterns- significant weight changes-  positive or negative  · Change in sleeping patterns- unable to sleep or sleeping all the time   · Unwillingness or inability to communicate  · Depression  · Unusual sadness, discouragement and loneliness  · Talk of wanting to die  · Neglect of personal appearance   · Rebelliousness- reckless behavior  · Withdrawal from people/activities they love  · Confusion- inability to concentrate     If you or a loved one observes any of these behaviors or has concerns about self-harm, here's what you can do:  · Talk about it- your feelings and reasons for harming yourself  · Remove any means that you might use to hurt yourself (examples: pills, rope, extension cords, firearm)  · Get professional help from the community (Mental Health, Substance Abuse, psychological counseling)  · Do not be alone:Call your Safe  Contact- someone whom you trust who will be there for you.  · Call your local CRISIS HOTLINE 989-4695 or 937-820-4457  · Call your local Children's Mobile Crisis Response Team [place] (245) 940-7695 or www.Howcast  · Call the toll free National Suicide Prevention Hotlines   · National Suicide Prevention Lifeline 539-533-EXBQ (7037)  · National [employer] Line Network 800-SUICIDE (445-1341)

## 2020-04-24 ENCOUNTER — HOSPITAL ENCOUNTER (OUTPATIENT)
Dept: HOSPITAL 8 - STAR | Age: 75
Discharge: HOME | End: 2020-04-24
Attending: NEUROLOGICAL SURGERY
Payer: MEDICARE

## 2020-04-24 DIAGNOSIS — M48.061: ICD-10-CM

## 2020-04-24 DIAGNOSIS — M47.816: ICD-10-CM

## 2020-04-24 DIAGNOSIS — Z01.818: Primary | ICD-10-CM

## 2020-04-24 DIAGNOSIS — Z90.49: ICD-10-CM

## 2020-04-24 LAB
ANION GAP SERPL CALC-SCNC: 6 MMOL/L (ref 5–15)
APTT BLD: 26 SECONDS (ref 25–31)
BASOPHILS # BLD AUTO: 0.02 X10^3/UL (ref 0–0.1)
BASOPHILS NFR BLD AUTO: 1 % (ref 0–1)
CALCIUM SERPL-MCNC: 9.6 MG/DL (ref 8.5–10.1)
CHLORIDE SERPL-SCNC: 99 MMOL/L (ref 98–107)
CREAT SERPL-MCNC: 0.66 MG/DL (ref 0.55–1.02)
EOSINOPHIL # BLD AUTO: 0.07 X10^3/UL (ref 0–0.4)
EOSINOPHIL NFR BLD AUTO: 2 % (ref 1–7)
ERYTHROCYTE [DISTWIDTH] IN BLOOD BY AUTOMATED COUNT: 13.9 % (ref 9.6–15.2)
INR PPP: 1.01 (ref 0.93–1.1)
LYMPHOCYTES # BLD AUTO: 1.15 X10^3/UL (ref 1–3.4)
LYMPHOCYTES NFR BLD AUTO: 26 % (ref 22–44)
MCH RBC QN AUTO: 31.6 PG (ref 27–34.8)
MCHC RBC AUTO-ENTMCNC: 33.8 G/DL (ref 32.4–35.8)
MCV RBC AUTO: 93.6 FL (ref 80–100)
MD: NO
MONOCYTES # BLD AUTO: 0.52 X10^3/UL (ref 0.2–0.8)
MONOCYTES NFR BLD AUTO: 12 % (ref 2–9)
NEUTROPHILS # BLD AUTO: 2.69 X10^3/UL (ref 1.8–6.8)
NEUTROPHILS NFR BLD AUTO: 61 % (ref 42–75)
PLATELET # BLD AUTO: 161 X10^3/UL (ref 130–400)
PMV BLD AUTO: 8.3 FL (ref 7.4–10.4)
PROTHROMBIN TIME: 10.7 SECONDS (ref 9.6–11.5)
RBC # BLD AUTO: 4.36 X10^6/UL (ref 3.82–5.3)

## 2020-04-24 PROCEDURE — 85610 PROTHROMBIN TIME: CPT

## 2020-04-24 PROCEDURE — 80048 BASIC METABOLIC PNL TOTAL CA: CPT

## 2020-04-24 PROCEDURE — 72110 X-RAY EXAM L-2 SPINE 4/>VWS: CPT

## 2020-04-24 PROCEDURE — 85025 COMPLETE CBC W/AUTO DIFF WBC: CPT

## 2020-04-24 PROCEDURE — 93005 ELECTROCARDIOGRAM TRACING: CPT

## 2020-04-24 PROCEDURE — 85730 THROMBOPLASTIN TIME PARTIAL: CPT

## 2020-04-24 PROCEDURE — 36415 COLL VENOUS BLD VENIPUNCTURE: CPT

## 2020-04-24 PROCEDURE — 71046 X-RAY EXAM CHEST 2 VIEWS: CPT

## 2020-04-30 ENCOUNTER — HOSPITAL ENCOUNTER (OUTPATIENT)
Facility: MEDICAL CENTER | Age: 75
End: 2020-04-30
Attending: NEUROLOGICAL SURGERY | Admitting: NEUROLOGICAL SURGERY
Payer: MEDICARE

## 2020-11-23 ENCOUNTER — HOSPITAL ENCOUNTER (OUTPATIENT)
Dept: RADIOLOGY | Facility: MEDICAL CENTER | Age: 75
End: 2020-11-23
Payer: MEDICARE

## 2020-12-09 NOTE — H&P (VIEW-ONLY)
New patient note    Physiatry (physical medicine and  Rehabilitation), interventional spine and sports medicine    Date of service: See epic    Chief complaint:   Chief Complaint   Patient presents with   • New Patient     Back pain        Referring provider: JACQUES Kim and Joshua peralta MD    HISTORY    HPI: Jovita Chaves 75 y.o.  who presents today with Diagnoses of Chronic bilateral low back pain without sciatica, Lumbar spondylosis, and History of lumbosacral spine surgery done by Dr Peralta were pertinent to this visit.    HPI    The patient has had chronic low back pain for many years.  She has had multiple different interventions including in 2010 a right-sided L4-5 laminectomy and in May 2020 a left-sided L4-5 laminectomy.  From an interventional procedure standpoint the patient has had multiple epidural steroid injections done by Dr. Barnhart and Dr. Lozano at [facility].  She is also had sacroiliac joint injections.  She was also seen in [facility] by Dr. Little and had trigger point injections.  These were not providing pain relief for the patient recently.  Medications previously tried include Mobic, Cymbalta, Neurontin.  The patient is been to extensive physical therapy including over 10 weeks of physical therapy and home exercise program which the patient has been diligent with.  She was also tried in the past oral steroids, Robaxin, meloxicam, cyclobenzaprine.    She continues to have significant mostly axial low back pain bilateral.  This has been progressively worsening over the past several months 8 out of 10 in intensity.  Very rarely this will radiate down the left leg however this is a significant minority of the patient's pain compared to her axial low back pain.  This pain is improved with sitting and worse with household ADLs including standing walking sitting the table dishes and making dinner.  Causing functional deficits.       Medical records  review:  I reviewed the notes from the referring provider JACQUES Kim MD..           ROS: Positive for hypothyroidism undergoing treatment.  Red Flags ROS:   Fever, Chills, Sweats: Denies  Involuntary Weight Loss: Denies  Bladder Incontinence: Denies  Bowel Incontinence: denies  Saddle Anesthesia: Denies    All other systems reviewed and negative.       PMHx:   Past Medical History:   Diagnosis Date   • Arthritis     lower back   • Cervicalgia    • Hypothyroid    • Migraine without aura, with intractable migraine, so stated, without mention of status migrainosus    • Obesity    • Pain     lower back 8/10   • Thoracic or lumbosacral neuritis or radiculitis, unspecified     Bilateral L4/5   • Unspecified disorder of thyroid          Current Outpatient Medications on File Prior to Visit   Medication Sig Dispense Refill   • divalproex (DEPAKOTE) 250 MG Tablet Delayed Response      • rosuvastatin (CRESTOR) 10 MG Tab      • aspirin  MG Tablet Delayed Response Take 1 Tab by mouth every day. (Patient taking differently: Take 81 mg by mouth every day.) 100 Tab 3   • Omega-3 Fatty Acids (RA FISH OIL) 1400 MG CAPSULE DELAYED RELEASE Take 2 Caps by mouth every day. 60 Cap 3   • levothyroxine (SYNTHROID) 125 MCG Tab Take 125 mcg by mouth Every morning on an empty stomach.     • Cholecalciferol (VITAMIN D3) 5000 units Cap Take 1 Cap by mouth every day.     • sumatriptan (IMITREX) 100 MG tablet Take 1 Tab by mouth. 1 tab at headache onset; repeat in 1 hour prn. 9 Tab 6   • atorvastatin (LIPITOR) 40 MG Tab Take 1 Tab by mouth every bedtime. (Patient not taking: Reported on 12/10/2020) 30 Tab 3   • Cyanocobalamin ([employer] VITAMIN B12) 2000 MCG Tab Take 1 Tab by mouth every day.       No current facility-administered medications on file prior to visit.         PSHx:   Past Surgical History:   Procedure Laterality Date   • IRRIGATION & DEBRIDEMENT NEURO  11/8/2010    Performed by JAENT FELIX at SURGERY  [facility]   • LUMBAR LAMINECTOMY DISKECTOMY  9/27/2010    Performed by JANET FELIX at SURGERY [facility]   • LAMINOTOMY  9/27/2010    Performed by JANET FELIX at SURGERY [facility]   • OTHER  2010    epidural injections   • OTHER  2009    left ankle debridement   • OTHER  2006    right foot gusman osteotomy   • OTHER  2005    tendon transfer right foot   • CHOLECYSTECTOMY  1983   • CHOLECYSTECTOMY  1983   • OTHER  1982    left knee repair   • OTHER  1951    hysterectomy   • OTHER ORTHOPEDIC SURGERY      Left knee, right foot tendon, Left ankle debridement   • ROTATOR CUFF REPAIR  [id number]    rotator cuff, brachioplexus nerve       Family history   Family History   Problem Relation Age of Onset   • Cancer Mother         Colon   • Other Father         Migraine   • Thyroid Mother         Hypothyroid         Medications: reviewed on epic.   Outpatient Medications Marked as Taking for the 12/10/20 encounter (Office Visit) with Will Vazquez M.D.   Medication Sig Dispense Refill   • divalproex (DEPAKOTE) 250 MG Tablet Delayed Response      • rosuvastatin (CRESTOR) 10 MG Tab      • aspirin  MG Tablet Delayed Response Take 1 Tab by mouth every day. (Patient taking differently: Take 81 mg by mouth every day.) 100 Tab 3   • Omega-3 Fatty Acids (RA FISH OIL) 1400 MG CAPSULE DELAYED RELEASE Take 2 Caps by mouth every day. 60 Cap 3   • levothyroxine (SYNTHROID) 125 MCG Tab Take 125 mcg by mouth Every morning on an empty stomach.     • Cholecalciferol (VITAMIN D3) 5000 units Cap Take 1 Cap by mouth every day.     • sumatriptan (IMITREX) 100 MG tablet Take 1 Tab by mouth. 1 tab at headache onset; repeat in 1 hour prn. 9 Tab 6        Allergies:   No Known Allergies    Social Hx:   Social History     Socioeconomic History   • Marital status:      Spouse name: Not on file   • Number of children: Not on file   • Years of education: Not on file   • Highest education level: Not on file    "  Occupational History   • Not on file   Social Needs   • Financial resource strain: Not on file   • Food insecurity     Worry: Not on file     Inability: Not on file   • Transportation needs     Medical: Not on file     Non-medical: Not on file   Tobacco Use   • Smoking status: Never Smoker   • Smokeless tobacco: Never Used   Substance and Sexual Activity   • Alcohol use: No   • Drug use: No   • Sexual activity: Not on file   Lifestyle   • Physical activity     Days per week: Not on file     Minutes per session: Not on file   • Stress: Not on file   Relationships   • Social connections     Talks on phone: Not on file     Gets together: Not on file     Attends [employer] service: Not on file     Active member of club or organization: Not on file     Attends meetings of clubs or organizations: Not on file     Relationship status: Not on file   • Intimate partner violence     Fear of current or ex partner: Not on file     Emotionally abused: Not on file     Physically abused: Not on file     Forced sexual activity: Not on file   Other Topics Concern   •  Service No   • Blood Transfusions Yes   • Caffeine Concern No   • Occupational Exposure No   • Hobby Hazards No   • Sleep Concern No   • Stress Concern No   • Weight Concern No   • Special Diet No   • Back Care No   • Exercise No   • Bike Helmet No   • Seat Belt Yes   • Self-Exams No   Social History Narrative   • Not on file         EXAMINATION     Physical Exam:   Vitals: /78 (BP Location: Right arm, Patient Position: Sitting, BP Cuff Size: Adult)   Pulse (!) 103   Temp 36.1 °C (96.9 °F) (Temporal)   Ht 1.727 m (5' 8\")   Wt 107 kg (235 lb 14.3 oz)   SpO2 96%     Constitutional:   Body Habitus: Body mass index is 35.87 kg/m².  Cooperation: Fully cooperates with exam  Appearance: Well-groomed, well-nourished, not disheveled     Eyes: No scleral icterus to suggest severe liver disease, no proptosis to suggest severe hyperthyroid    ENT -no obvious " auditory deficits, no obvious tongue lesions, tongue midline, no facial droop     Skin -no rashes or lesions noted     Respiratory-  breathing comfortable on room air, no audible wheezing    Cardiovascular- capillary refills less than 2 seconds. No lower extremity edema is noted.     Gastrointestinal - no obvious abdominal masses, No tenderness to palpation in the abdomen    Psychiatric- alert and oriented ×3. Normal affect.     Gait - normal gait, no use of ambulatory device, nonantalgic. the patient can toe walk with ease. the patient can heel walk with ease. the patient can tandem walk with ease. balance is appropriate..     Musculoskeletal and Neuro -       Thoracic/Lumbar Spine/Sacral Spine/Hips   Inspection: No evidence of atrophy in bilateral lower extremities throughout     ROM: decreased active range of motion with flexion, lateral flexion, and rotation bilaterally.   There is decreased active range of motion with lumbar extension with pain.    There is pain with facet loading maneuver (extension rotation) with axial low back pain on the BILATERAL side(s)    Palpation:   No tenderness to palpation in midline at T1-T12 levels. No tenderness to palpation in the left and right of the midline T1-L5, NEGATIVE for tenderness to palpation to the para-midline region in the lower lumbar levels.  palpation over SI joint: negative bilaterally    palpation in hip or over the gluteus medius tendon insertion: negative bilaterally      Lumbar spine Special tests  Neuro tension  Straight leg test negative bilaterally    Slump test negative bilaterally      HIP  FAIR test negative bilaterally    Range of motion in the RIGHT hip is full  in flexion, extension, abduction, internal rotation, external rotation.  Range of motion in the LEFT hip is full  in flexion, extension, abduction, internal rotation, external rotation.      SI joint tests  Observation patient sits on one buttocks: Negative  SI joint compression negative  bilaterally    SI joint distraction negative bilaterally    Thigh thrust test negative bilaterally    NICOLE test negative bilaterally                 Key points for the international standards for neurological classification of spinal cord injury (ISNCSCI) to light touch.     Dermatome R L                                      L2 2 2   L3 2 2   L4 2 2   L5 2 2   S1 2 2   S2 2 2       Motor Exam Lower Extremities    ? Myotome R L   Hip flexion L2 5 5   Knee extension L3 5 5   Ankle dorsiflexion L4 5 5   Toe extension L5 5 5   Ankle plantarflexion S1 5 5         Reflexes  ?  R L                Patella  2+ 2+   Achilles   2+ 2+       Babinski sign negative bilaterally   Clonus of the ankle negative bilaterally       MEDICAL DECISION MAKING    Medical records review: see under HPI section.     DATA    Labs:   Lab Results   Component Value Date/Time    SODIUM 137 12/01/2017 03:08 PM    POTASSIUM 3.5 (L) 12/01/2017 03:08 PM    CHLORIDE 103 12/01/2017 03:08 PM    CO2 24 12/01/2017 03:08 PM    ANION 10.0 12/01/2017 03:08 PM    GLUCOSE 104 (H) 12/01/2017 03:08 PM    BUN 19 12/01/2017 03:08 PM    CREATININE 0.85 12/01/2017 03:08 PM    CALCIUM 9.6 12/01/2017 03:08 PM   ]    Lab Results   Component Value Date/Time    PROTHROMBTM 12.5 12/01/2017 03:08 PM    INR 0.97 12/01/2017 03:08 PM        Lab Results   Component Value Date/Time    WBC 6.9 12/01/2017 03:08 PM    RBC 4.50 12/01/2017 03:08 PM    HEMOGLOBIN 14.3 12/01/2017 03:08 PM    HEMATOCRIT 42.3 12/01/2017 03:08 PM    MCV 94.0 12/01/2017 03:08 PM    MCH 31.8 12/01/2017 03:08 PM    MCHC 33.8 12/01/2017 03:08 PM    MPV 10.6 12/01/2017 03:08 PM    NEUTSPOLYS 54.90 12/01/2017 03:08 PM    LYMPHOCYTES 33.00 12/01/2017 03:08 PM    MONOCYTES 8.70 12/01/2017 03:08 PM    EOSINOPHILS 2.50 12/01/2017 03:08 PM    BASOPHILS 0.60 12/01/2017 03:08 PM        Lab Results   Component Value Date/Time    HBA1C 5.6 12/01/2017 03:28 PM        Imaging:   I personally reviewed following images,  these are my reads  MRI lumbar spine 9/4/2020  Status post L4-5 laminectomy.  On the left L4-5 facet joint there is a synovial cyst seen on the axial and sagittal views.  There is significant facet arthropathy worst bilaterally at L4-5 and L5-S1 but also present at L3-4.  There are no areas of high-grade central canal stenosis.    IMAGING radiology reads. I reviewed the following radiology reads                        MRI lumbar spine 9/4/2020     MR-LUMBAR SPINE-W/O                                                                                                                   Results for orders placed in visit on 11/23/20   DX-LUMBAR SPINE-4+ VIEWS                                         Diagnosis   Visit Diagnoses     ICD-10-CM   1. Chronic bilateral low back pain without sciatica  M54.5    G89.29   2. Lumbar spondylosis  M47.816   3. History of lumbosacral spine surgery done by Dr Yun  Z98.890           ASSESSMENT AND PLAN:  Jovita Chaves 75 y.o. female      Jovita was seen today for new patient.    Diagnoses and all orders for this visit:    Chronic bilateral low back pain without sciatica    Lumbar spondylosis  -     REFERRAL TO PHYSICAL MEDICINE REHAB  -     REFERRAL TO PHYSICAL MEDICINE REHAB    History of lumbosacral spine surgery done by Dr Yun        We discussed spinal cord stimulation however the patient's imaging findings of significant facet arthropathy bilaterally and a facet cyst on the right at L4-5 with facet arthropathy at L4-5 and L5-S1 consistent with the patient's pain we decided to proceed first with diagnostic medial branch blocks.     The patient is failed conservative treatments with medication management, home exercise program from the direction of physical therapy/physician including the past 8 weeks.  I have ordered a BILATERAL diagnostic medial branch block targeting the L4-5 and L5-S1 facet joints #1 and #2.  Procedure #2 is only to be done if #1 is a positive  block.    The risks benefits and alternatives to this procedure were discussed and the patient wishes to proceed with the procedure. Risks include but are not limited to damage to surrounding structures, infection, bleeding, worsening of pain which can be permanent, weakness which can be permanent. Benefits include pain relief, improved function. Alternatives includes not doing the procedure.   If there are 2 positive blocks I would consider the patient for radiofrequency neurotomy of the previously blocked nerves.     If the patient fails these medial branch blocks I would consider L3-S1 diagnostic medial branch blocks.  If she fails these then I would consider her for spinal cord stimulation trials.    Diagnostic workup: With the diagnostic procedures above      Follow-up: After the above diagnostic studies       I requested the outside records himself obtain spine care in [facility].    Please note that this dictation was created using voice recognition software. I have made every reasonable attempt to correct obvious errors but there may be errors of grammar and content that I may have overlooked prior to finalization of this note.      Will Vazquez MD  Physical Medicine and Rehabilitation  Interventional Spine and Sports Physiatry  [facility]           CC JACQUES Kim MD CC Steven L Elliott, M.D.  .

## 2020-12-09 NOTE — PROGRESS NOTES
New patient note    Physiatry (physical medicine and  Rehabilitation), interventional spine and sports medicine    Date of service: See epic    Chief complaint:   Chief Complaint   Patient presents with   • New Patient     Back pain        Referring provider: JACQUES Kim and Joshua peralta MD    HISTORY    HPI: Jovita Chaves 75 y.o.  who presents today with Diagnoses of Chronic bilateral low back pain without sciatica, Lumbar spondylosis, and History of lumbosacral spine surgery done by Dr Peralta were pertinent to this visit.    HPI    The patient has had chronic low back pain for many years.  She has had multiple different interventions including in 2010 a right-sided L4-5 laminectomy and in May 2020 a left-sided L4-5 laminectomy.  From an interventional procedure standpoint the patient has had multiple epidural steroid injections done by Dr. Barnhart and Dr. Lozano at [facility].  She is also had sacroiliac joint injections.  She was also seen in [facility] by Dr. Little and had trigger point injections.  These were not providing pain relief for the patient recently.  Medications previously tried include Mobic, Cymbalta, Neurontin.  The patient is been to extensive physical therapy including over 10 weeks of physical therapy and home exercise program which the patient has been diligent with.  She was also tried in the past oral steroids, Robaxin, meloxicam, cyclobenzaprine.    She continues to have significant mostly axial low back pain bilateral.  This has been progressively worsening over the past several months 8 out of 10 in intensity.  Very rarely this will radiate down the left leg however this is a significant minority of the patient's pain compared to her axial low back pain.  This pain is improved with sitting and worse with household ADLs including standing walking sitting the table dishes and making dinner.  Causing functional deficits.       Medical records  review:  I reviewed the notes from the referring provider JACQUES Kim MD..           ROS: Positive for hypothyroidism undergoing treatment.  Red Flags ROS:   Fever, Chills, Sweats: Denies  Involuntary Weight Loss: Denies  Bladder Incontinence: Denies  Bowel Incontinence: denies  Saddle Anesthesia: Denies    All other systems reviewed and negative.       PMHx:   Past Medical History:   Diagnosis Date   • Arthritis     lower back   • Cervicalgia    • Hypothyroid    • Migraine without aura, with intractable migraine, so stated, without mention of status migrainosus    • Obesity    • Pain     lower back 8/10   • Thoracic or lumbosacral neuritis or radiculitis, unspecified     Bilateral L4/5   • Unspecified disorder of thyroid          Current Outpatient Medications on File Prior to Visit   Medication Sig Dispense Refill   • divalproex (DEPAKOTE) 250 MG Tablet Delayed Response      • rosuvastatin (CRESTOR) 10 MG Tab      • aspirin  MG Tablet Delayed Response Take 1 Tab by mouth every day. (Patient taking differently: Take 81 mg by mouth every day.) 100 Tab 3   • Omega-3 Fatty Acids (RA FISH OIL) 1400 MG CAPSULE DELAYED RELEASE Take 2 Caps by mouth every day. 60 Cap 3   • levothyroxine (SYNTHROID) 125 MCG Tab Take 125 mcg by mouth Every morning on an empty stomach.     • Cholecalciferol (VITAMIN D3) 5000 units Cap Take 1 Cap by mouth every day.     • sumatriptan (IMITREX) 100 MG tablet Take 1 Tab by mouth. 1 tab at headache onset; repeat in 1 hour prn. 9 Tab 6   • atorvastatin (LIPITOR) 40 MG Tab Take 1 Tab by mouth every bedtime. (Patient not taking: Reported on 12/10/2020) 30 Tab 3   • Cyanocobalamin ([employer] VITAMIN B12) 2000 MCG Tab Take 1 Tab by mouth every day.       No current facility-administered medications on file prior to visit.         PSHx:   Past Surgical History:   Procedure Laterality Date   • IRRIGATION & DEBRIDEMENT NEURO  11/8/2010    Performed by JANET FELIX at SURGERY  [facility] ORS   • LUMBAR LAMINECTOMY DISKECTOMY  9/27/2010    Performed by JANET FELIX at SURGERY [facility] ORS   • LAMINOTOMY  9/27/2010    Performed by JANET FELIX at SURGERY [facility] ORS   • OTHER  2010    epidural injections   • OTHER  2009    left ankle debridement   • OTHER  2006    right foot gusman osteotomy   • OTHER  2005    tendon transfer right foot   • CHOLECYSTECTOMY  1983   • CHOLECYSTECTOMY  1983   • OTHER  1982    left knee repair   • OTHER  1951    hysterectomy   • OTHER ORTHOPEDIC SURGERY      Left knee, right foot tendon, Left ankle debridement   • ROTATOR CUFF REPAIR  [id number]    rotator cuff, brachioplexus nerve       Family history   Family History   Problem Relation Age of Onset   • Cancer Mother         Colon   • Other Father         Migraine   • Thyroid Mother         Hypothyroid         Medications: reviewed on epic.   Outpatient Medications Marked as Taking for the 12/10/20 encounter (Office Visit) with Will Vazquez M.D.   Medication Sig Dispense Refill   • divalproex (DEPAKOTE) 250 MG Tablet Delayed Response      • rosuvastatin (CRESTOR) 10 MG Tab      • aspirin  MG Tablet Delayed Response Take 1 Tab by mouth every day. (Patient taking differently: Take 81 mg by mouth every day.) 100 Tab 3   • Omega-3 Fatty Acids (RA FISH OIL) 1400 MG CAPSULE DELAYED RELEASE Take 2 Caps by mouth every day. 60 Cap 3   • levothyroxine (SYNTHROID) 125 MCG Tab Take 125 mcg by mouth Every morning on an empty stomach.     • Cholecalciferol (VITAMIN D3) 5000 units Cap Take 1 Cap by mouth every day.     • sumatriptan (IMITREX) 100 MG tablet Take 1 Tab by mouth. 1 tab at headache onset; repeat in 1 hour prn. 9 Tab 6        Allergies:   No Known Allergies    Social Hx:   Social History     Socioeconomic History   • Marital status:      Spouse name: Not on file   • Number of children: Not on file   • Years of education: Not on file   • Highest education level: Not on file    "  Occupational History   • Not on file   Social Needs   • Financial resource strain: Not on file   • Food insecurity     Worry: Not on file     Inability: Not on file   • Transportation needs     Medical: Not on file     Non-medical: Not on file   Tobacco Use   • Smoking status: Never Smoker   • Smokeless tobacco: Never Used   Substance and Sexual Activity   • Alcohol use: No   • Drug use: No   • Sexual activity: Not on file   Lifestyle   • Physical activity     Days per week: Not on file     Minutes per session: Not on file   • Stress: Not on file   Relationships   • Social connections     Talks on phone: Not on file     Gets together: Not on file     Attends Druze service: Not on file     Active member of club or organization: Not on file     Attends meetings of clubs or organizations: Not on file     Relationship status: Not on file   • Intimate partner violence     Fear of current or ex partner: Not on file     Emotionally abused: Not on file     Physically abused: Not on file     Forced sexual activity: Not on file   Other Topics Concern   •  Service No   • Blood Transfusions Yes   • Caffeine Concern No   • Occupational Exposure No   • Hobby Hazards No   • Sleep Concern No   • Stress Concern No   • Weight Concern No   • Special Diet No   • Back Care No   • Exercise No   • Bike Helmet No   • Seat Belt Yes   • Self-Exams No   Social History Narrative   • Not on file         EXAMINATION     Physical Exam:   Vitals: /78 (BP Location: Right arm, Patient Position: Sitting, BP Cuff Size: Adult)   Pulse (!) 103   Temp 36.1 °C (96.9 °F) (Temporal)   Ht 1.727 m (5' 8\")   Wt 107 kg (235 lb 14.3 oz)   SpO2 96%     Constitutional:   Body Habitus: Body mass index is 35.87 kg/m².  Cooperation: Fully cooperates with exam  Appearance: Well-groomed, well-nourished, not disheveled     Eyes: No scleral icterus to suggest severe liver disease, no proptosis to suggest severe hyperthyroid    ENT -no obvious " auditory deficits, no obvious tongue lesions, tongue midline, no facial droop     Skin -no rashes or lesions noted     Respiratory-  breathing comfortable on room air, no audible wheezing    Cardiovascular- capillary refills less than 2 seconds. No lower extremity edema is noted.     Gastrointestinal - no obvious abdominal masses, No tenderness to palpation in the abdomen    Psychiatric- alert and oriented ×3. Normal affect.     Gait - normal gait, no use of ambulatory device, nonantalgic. the patient can toe walk with ease. the patient can heel walk with ease. the patient can tandem walk with ease. balance is appropriate..     Musculoskeletal and Neuro -       Thoracic/Lumbar Spine/Sacral Spine/Hips   Inspection: No evidence of atrophy in bilateral lower extremities throughout     ROM: decreased active range of motion with flexion, lateral flexion, and rotation bilaterally.   There is decreased active range of motion with lumbar extension with pain.    There is pain with facet loading maneuver (extension rotation) with axial low back pain on the BILATERAL side(s)    Palpation:   No tenderness to palpation in midline at T1-T12 levels. No tenderness to palpation in the left and right of the midline T1-L5, NEGATIVE for tenderness to palpation to the para-midline region in the lower lumbar levels.  palpation over SI joint: negative bilaterally    palpation in hip or over the gluteus medius tendon insertion: negative bilaterally      Lumbar spine Special tests  Neuro tension  Straight leg test negative bilaterally    Slump test negative bilaterally      HIP  FAIR test negative bilaterally    Range of motion in the RIGHT hip is full  in flexion, extension, abduction, internal rotation, external rotation.  Range of motion in the LEFT hip is full  in flexion, extension, abduction, internal rotation, external rotation.      SI joint tests  Observation patient sits on one buttocks: Negative  SI joint compression negative  bilaterally    SI joint distraction negative bilaterally    Thigh thrust test negative bilaterally    NICOLE test negative bilaterally                 Key points for the international standards for neurological classification of spinal cord injury (ISNCSCI) to light touch.     Dermatome R L                                      L2 2 2   L3 2 2   L4 2 2   L5 2 2   S1 2 2   S2 2 2       Motor Exam Lower Extremities    ? Myotome R L   Hip flexion L2 5 5   Knee extension L3 5 5   Ankle dorsiflexion L4 5 5   Toe extension L5 5 5   Ankle plantarflexion S1 5 5         Reflexes  ?  R L                Patella  2+ 2+   Achilles   2+ 2+       Babinski sign negative bilaterally   Clonus of the ankle negative bilaterally       MEDICAL DECISION MAKING    Medical records review: see under HPI section.     DATA    Labs:   Lab Results   Component Value Date/Time    SODIUM 137 12/01/2017 03:08 PM    POTASSIUM 3.5 (L) 12/01/2017 03:08 PM    CHLORIDE 103 12/01/2017 03:08 PM    CO2 24 12/01/2017 03:08 PM    ANION 10.0 12/01/2017 03:08 PM    GLUCOSE 104 (H) 12/01/2017 03:08 PM    BUN 19 12/01/2017 03:08 PM    CREATININE 0.85 12/01/2017 03:08 PM    CALCIUM 9.6 12/01/2017 03:08 PM   ]    Lab Results   Component Value Date/Time    PROTHROMBTM 12.5 12/01/2017 03:08 PM    INR 0.97 12/01/2017 03:08 PM        Lab Results   Component Value Date/Time    WBC 6.9 12/01/2017 03:08 PM    RBC 4.50 12/01/2017 03:08 PM    HEMOGLOBIN 14.3 12/01/2017 03:08 PM    HEMATOCRIT 42.3 12/01/2017 03:08 PM    MCV 94.0 12/01/2017 03:08 PM    MCH 31.8 12/01/2017 03:08 PM    MCHC 33.8 12/01/2017 03:08 PM    MPV 10.6 12/01/2017 03:08 PM    NEUTSPOLYS 54.90 12/01/2017 03:08 PM    LYMPHOCYTES 33.00 12/01/2017 03:08 PM    MONOCYTES 8.70 12/01/2017 03:08 PM    EOSINOPHILS 2.50 12/01/2017 03:08 PM    BASOPHILS 0.60 12/01/2017 03:08 PM        Lab Results   Component Value Date/Time    HBA1C 5.6 12/01/2017 03:28 PM        Imaging:   I personally reviewed following images,  these are my reads  MRI lumbar spine 9/4/2020  Status post L4-5 laminectomy.  On the left L4-5 facet joint there is a synovial cyst seen on the axial and sagittal views.  There is significant facet arthropathy worst bilaterally at L4-5 and L5-S1 but also present at L3-4.  There are no areas of high-grade central canal stenosis.    IMAGING radiology reads. I reviewed the following radiology reads                        MRI lumbar spine 9/4/2020     MR-LUMBAR SPINE-W/O                                                                                                                   Results for orders placed in visit on 11/23/20   DX-LUMBAR SPINE-4+ VIEWS                                         Diagnosis   Visit Diagnoses     ICD-10-CM   1. Chronic bilateral low back pain without sciatica  M54.5    G89.29   2. Lumbar spondylosis  M47.816   3. History of lumbosacral spine surgery done by Dr Yun  Z98.890           ASSESSMENT AND PLAN:  Jovita Chaves 75 y.o. female      Jovita was seen today for new patient.    Diagnoses and all orders for this visit:    Chronic bilateral low back pain without sciatica    Lumbar spondylosis  -     REFERRAL TO PHYSICAL MEDICINE REHAB  -     REFERRAL TO PHYSICAL MEDICINE REHAB    History of lumbosacral spine surgery done by Dr Yun        We discussed spinal cord stimulation however the patient's imaging findings of significant facet arthropathy bilaterally and a facet cyst on the right at L4-5 with facet arthropathy at L4-5 and L5-S1 consistent with the patient's pain we decided to proceed first with diagnostic medial branch blocks.     The patient is failed conservative treatments with medication management, home exercise program from the direction of physical therapy/physician including the past 8 weeks.  I have ordered a BILATERAL diagnostic medial branch block targeting the L4-5 and L5-S1 facet joints #1 and #2.  Procedure #2 is only to be done if #1 is a positive  block.    The risks benefits and alternatives to this procedure were discussed and the patient wishes to proceed with the procedure. Risks include but are not limited to damage to surrounding structures, infection, bleeding, worsening of pain which can be permanent, weakness which can be permanent. Benefits include pain relief, improved function. Alternatives includes not doing the procedure.   If there are 2 positive blocks I would consider the patient for radiofrequency neurotomy of the previously blocked nerves.     If the patient fails these medial branch blocks I would consider L3-S1 diagnostic medial branch blocks.  If she fails these then I would consider her for spinal cord stimulation trials.    Diagnostic workup: With the diagnostic procedures above      Follow-up: After the above diagnostic studies       I requested the outside records himself obtain spine care in [facility].    Please note that this dictation was created using voice recognition software. I have made every reasonable attempt to correct obvious errors but there may be errors of grammar and content that I may have overlooked prior to finalization of this note.      Will Vazquez MD  Physical Medicine and Rehabilitation  Interventional Spine and Sports Physiatry  [facility]           CC JACQUES Kim MD CC Steven L Elliott, M.D.  .

## 2020-12-10 ENCOUNTER — OFFICE VISIT (OUTPATIENT)
Dept: PHYSICAL MEDICINE AND REHAB | Facility: MEDICAL CENTER | Age: 75
End: 2020-12-10
Payer: MEDICARE

## 2020-12-10 VITALS
SYSTOLIC BLOOD PRESSURE: 122 MMHG | TEMPERATURE: 96.9 F | WEIGHT: 235.89 LBS | HEART RATE: 103 BPM | DIASTOLIC BLOOD PRESSURE: 78 MMHG | BODY MASS INDEX: 35.75 KG/M2 | OXYGEN SATURATION: 96 % | HEIGHT: 68 IN

## 2020-12-10 DIAGNOSIS — M54.50 CHRONIC BILATERAL LOW BACK PAIN WITHOUT SCIATICA: ICD-10-CM

## 2020-12-10 DIAGNOSIS — Z98.890 HISTORY OF LUMBOSACRAL SPINE SURGERY: ICD-10-CM

## 2020-12-10 DIAGNOSIS — M47.816 LUMBAR SPONDYLOSIS: ICD-10-CM

## 2020-12-10 DIAGNOSIS — G89.29 CHRONIC BILATERAL LOW BACK PAIN WITHOUT SCIATICA: ICD-10-CM

## 2020-12-10 PROCEDURE — 99205 OFFICE O/P NEW HI 60 MIN: CPT | Performed by: PHYSICAL MEDICINE & REHABILITATION

## 2020-12-10 RX ORDER — DIVALPROEX SODIUM 250 MG/1
TABLET, DELAYED RELEASE ORAL
COMMUNITY
Start: 2020-09-15

## 2020-12-10 RX ORDER — ROSUVASTATIN CALCIUM 10 MG/1
TABLET, COATED ORAL
COMMUNITY
Start: 2020-09-15

## 2020-12-10 ASSESSMENT — PATIENT HEALTH QUESTIONNAIRE - PHQ9: CLINICAL INTERPRETATION OF PHQ2 SCORE: 0

## 2020-12-10 ASSESSMENT — PAIN SCALES - GENERAL: PAINLEVEL: 8=MODERATE-SEVERE PAIN

## 2020-12-10 NOTE — PATIENT INSTRUCTIONS
Your procedure will be at the [facility] special procedure suite.    [facility] [place] [address]       PRE-PROCEDURE INSTRUCTIONS  You may take your regular medications except:   · No Anti-inflammatories 5 days prior to your procedure. Anti-inflammatories include medicines such as  ibuprofen (Motrin, Advil), Excedrin, Naproxen (Aleve, Anaprox, Naprelan, Naprosyn), Celecoxib (Celebrex), Diclofenac (Voltaren-XR tab), and Meloxicam (Mobic).   · You can take the remainder of your pain medications as prescribed.   · If you are having a diagnostic procedure such as a medial branch block, do not use her pain meds on the day of the procedure  · No Glucophage or Metformin 24 hours before your procedure. You may resume next day after your procedure.  · Call the physiatry office if you are taking or prescribed anti-biotics within five days of procedure.  · Please ask provider if you are taking any new diabetes medication.  · CONTINUE TAKING BLOOD PRESSURE MEDICATIONS AS PRESCRIBED.  · Pain medications will not be prescribed on the procedure day. Procedural pain medication may be used by your provider   · Call your doctor's office performing the procedure if you have a fever, chills, rash or new illness prior to your procedure    Anticoagulation/antiplatelet medications  No Blood thinning medications such as Coumadin or Plavix 5 days prior to procedure unless your doctor said to continue these medications. Call your doctor if a new medication is prescribed in this class.     Restrictions for eating before procedure:   · If you are getting procedural sedation, then do not eat to for 8 hours prior to procedure appointment time. Do not drink fluids for four hours prior to your procedure time.   · If you are not having procedural sedation, then Skip the meal prior to your procedure. If you have a morning procedure then skip breakfast. If you have an afternoon procedure then skip lunch.   · You may drink clear  liquids up to 2 hours prior to your procedure  · You must have a  the day of procedure to accompany you home.      POST PROCEDURE INSTRUCTIONS   · No heavy lifting, strenuous bending or strenuous exercise for 3 days after your procedure.  · No hot tubs, baths, swimming for 3 days after your procedure  · You can remove the bandage the day after the procedure.  · IF YOU RECEIVED A DIAGNOSTIC PROCEDURE (SUCH AS A MEDIAL BRANCH BLOCK), PLEASE NOTE THAT WE DO EXPECT THIS INJECTION TO WEAR OFF.  IT IS IMPORTANT TO COMPLETE THE PAIN DIARY AND LIST THE PAIN SCORE ONLY FOR THE REGION WHERE THE PROCEDURE WAS AND BRING THIS TO YOUR FOLLOW UP VISIT.  · IF YOU EXPERIENCE PROLONGED WEAKNESS LONGER THAN ONE DAY, BOWEL OR BLADDER INCONTINENCE THEN PLEASE CALL THE PHYSIATRY OFFICE.  · Your leg may feel heavy, weak and numb for up to 1-2 days. Be very careful walking.   ·  You may resume normal activities 3 days after procedure.

## 2020-12-10 NOTE — Clinical Note
Request records from [facility] spine care in [facility]

## 2020-12-11 ENCOUNTER — HOSPITAL ENCOUNTER (OUTPATIENT)
Facility: REHABILITATION | Age: 75
End: 2020-12-11
Attending: PHYSICAL MEDICINE & REHABILITATION | Admitting: PHYSICAL MEDICINE & REHABILITATION
Payer: MEDICARE

## 2021-01-04 ENCOUNTER — HOSPITAL ENCOUNTER (OUTPATIENT)
Facility: REHABILITATION | Age: 76
End: 2021-01-04
Attending: PHYSICAL MEDICINE & REHABILITATION | Admitting: PHYSICAL MEDICINE & REHABILITATION
Payer: MEDICARE

## 2021-01-04 ENCOUNTER — APPOINTMENT (OUTPATIENT)
Dept: RADIOLOGY | Facility: REHABILITATION | Age: 76
End: 2021-01-04
Attending: PHYSICAL MEDICINE & REHABILITATION
Payer: MEDICARE

## 2021-01-04 VITALS
HEIGHT: 68 IN | SYSTOLIC BLOOD PRESSURE: 143 MMHG | RESPIRATION RATE: 16 BRPM | HEART RATE: 92 BPM | DIASTOLIC BLOOD PRESSURE: 67 MMHG | OXYGEN SATURATION: 95 % | BODY MASS INDEX: 35.52 KG/M2 | WEIGHT: 234.35 LBS

## 2021-01-04 PROCEDURE — 700117 HCHG RX CONTRAST REV CODE 255

## 2021-01-04 PROCEDURE — 64494 INJ PARAVERT F JNT L/S 2 LEV: CPT

## 2021-01-04 PROCEDURE — 700111 HCHG RX REV CODE 636 W/ 250 OVERRIDE (IP)

## 2021-01-04 PROCEDURE — 700101 HCHG RX REV CODE 250

## 2021-01-04 PROCEDURE — 64493 INJ PARAVERT F JNT L/S 1 LEV: CPT

## 2021-01-04 RX ORDER — LIDOCAINE HYDROCHLORIDE 10 MG/ML
INJECTION, SOLUTION EPIDURAL; INFILTRATION; INTRACAUDAL; PERINEURAL
Status: COMPLETED
Start: 2021-01-04 | End: 2021-01-04

## 2021-01-04 RX ORDER — ONDANSETRON 2 MG/ML
4 INJECTION INTRAMUSCULAR; INTRAVENOUS
Status: DISCONTINUED | OUTPATIENT
Start: 2021-01-04 | End: 2021-01-04 | Stop reason: HOSPADM

## 2021-01-04 RX ORDER — LIDOCAINE HYDROCHLORIDE 20 MG/ML
INJECTION, SOLUTION EPIDURAL; INFILTRATION; INTRACAUDAL; PERINEURAL
Status: COMPLETED
Start: 2021-01-04 | End: 2021-01-04

## 2021-01-04 RX ADMIN — IOHEXOL 10 ML: 240 INJECTION, SOLUTION INTRATHECAL; INTRAVASCULAR; INTRAVENOUS; ORAL at 08:53

## 2021-01-04 RX ADMIN — LIDOCAINE HYDROCHLORIDE 10 ML: 20 INJECTION, SOLUTION EPIDURAL; INFILTRATION; INTRACAUDAL; PERINEURAL at 08:53

## 2021-01-04 RX ADMIN — LIDOCAINE HYDROCHLORIDE 10 ML: 10 INJECTION, SOLUTION EPIDURAL; INFILTRATION; INTRACAUDAL; PERINEURAL at 08:53

## 2021-01-04 ASSESSMENT — PAIN DESCRIPTION - PAIN TYPE: TYPE: CHRONIC PAIN

## 2021-01-04 NOTE — PROGRESS NOTES
Pt ambulated to recovery by procedure room RN, drinking water, denies pain, assessed my MD, VSS, adhesive coverlets x4 to low back with scant sanguinous drng to 1 with other 3 cdi. Pt d/c'd per order, ambulated to vehicle, SBA, by RN

## 2021-01-04 NOTE — OR SURGEON
Immediate Post OP Note    PreOp Diagnosis: Lumbar spondylosis    PostOp Diagnosis: Lumbar spondylosis    Procedure(s):  BLOCK, NERVE, SPINAL, LUMBAR, POSTERIOR RAMUS, MEDIAL BRANCH - Wound Class: Clean    Surgeon(s):  Will Vazquez M.D.    Anesthesiologist/Type of Anesthesia:  No anesthesia staff entered./Local    Surgical Staff:  Circulator: Michelle Calderon R.N.  Scrub Person: Yelena Fitzgerald  Radiology Technologist: Pretty Lemos    Specimens removed if any:  * No specimens in log *    Estimated Blood Loss: None    Findings: None    Complications: None        1/4/2021 9:00 AM Will Vazquez M.D.

## 2021-01-04 NOTE — PROGRESS NOTES
Preprocedure assessment completed.  Pertinent health information(NKA/ 1st Vsist) communicated to physician and staff during time out.  Patient positioned preprocedure by RN, CST, and XRAY.  Pillow under ankles for support.    Michelle Calderon

## 2021-01-04 NOTE — OP REPORT
Date of Service: 1/4/2021     Patient: Jovita Chaves 75 y.o. female     MRN: [id number]     Physician/s: Will Vazquez MD    Pre-operative Diagnosis: Lumbosacral spondylosis, facet arthropathy. The patient was NOT seen for lumbar radiculopathy today.     Post-operative Diagnosis: Lumbosacral spondylosis, facet arthropathy. The patient was NOT seen for lumbar radiculopathy today.     Procedure: Medial Branch Blocks targeting the bilateral  L4-5 and L5-S1  facet joints     Description of procedure:    The risks, benefits, and alternatives of the procedure were reviewed and discussed with the patient.  Written informed consent was freely obtained. A pre-procedural time-out was conducted by the physician verifying patient’s identity, procedure to be performed, procedure site and side, and allergy verification. Appropriate equipment was determined to be in place for the procedure.         In the fluoroscopy suite the patient was placed in a prone position and the skin was prepped and draped in the usual sterile fashion. The fluoroscope was placed over the lower back at the appropriate angles, and the targets for injection were marked. A 27g needle was placed into each of the markings at the levels below, and approx 1cc of 1% Lidocaine was injected subcutaneously into the epidermal and dermal layers. The needle was removed intact.     Using an oblique view, A 25g 3.5 inch needle was then placed at the intersection of the transverse process and superior articular process at the S1 on the right side. The needle tips were then verified by AP, oblique, and lateral views.     Using an oblique view, A 25g 3.5 inch needle was then placed at the intersection of the transverse process and superior articular process at the L5-S1 on the right side. The needle tips were then verified by AP, oblique, and lateral views.     Using an oblique view, A 25g 3.5 inch needle was then placed at the intersection of the transverse  process and superior articular process at the L4-5 on the right side. The needle tips were then verified by AP, oblique, and lateral views.     Using an oblique view, A 25g 3.5 inch needle was then placed at the intersection of the transverse process and superior articular process at the S1 on the left side. The needle tips were then verified by AP, oblique, and lateral views.       Using an oblique view, A 25g 3.5 inch needle was then placed at the intersection of the transverse process and superior articular process at the L5-S1 on the left side. The needle tips were then verified by AP, oblique, and lateral views.     Using an oblique view, A 25g 3.5 inch needle was then placed at the intersection of the transverse process and superior articular process at the L4-5 on the left side. The needle tips were then verified by AP, oblique, and lateral views.       In the AP view, less than 1 cc of contrast dye was used to highlight the medial branch while the fluoroscope was running live at the levels above. Following negative aspiration, approximately 1mL of 2% lidocaine  was then injected at the above levels, and the needles were removed intact after restyleted. The patient's back was covered with a 4x4 gauze, the area was cleansed with sterile normal saline, and a dressing was applied.     There were no complications noted, the patient was hemodynamically stable, and tolerated the procedure well.     The patient had 100% pain relief of the index pain minutes post procedure.    Preprocedure pain 8/10 with standing and walking  Postprocedure pain 0/10 with standing and walking.  Dramatic improvement in function    Follow-up as scheduled      Will Vazquez MD  Physical Medicine and Rehabilitation  Interventional Spine and Sports Physiatry  [facility]            CPT  Intraarticular joint or medial branch block (MBB) - lumbar or sacral (1st level):  76468-34  Intraarticular joint or medial branch block (MBB) -  lumbar or sacral (2nd level):  86639-73

## 2021-01-04 NOTE — INTERVAL H&P NOTE
H&P reviewed. The patient was examined and there are no changes to the H&P     Lungs clear to auscultation bilaterally.  No abdominal tenderness.  Heart regular rate and rhythm.  Vitals reviewed.    Proceed with the originally scheduled procedure.  The risks, benefits and alternatives were discussed.  The patient understands these.    Will Vazquez MD  Physical Medicine and Rehabilitation  Interventional Spine and Sports Physiatry  [facility]

## 2021-01-04 NOTE — PROGRESS NOTES
Pt given verbal and written d/c instructions including verbal infection prevention information and s/s of post procedure infection and verbalizes understanding. denies nsaid use in last 3 days, denies blood thinners use in last 5 days, denies current infection or abx use. Med rec complete. Pt has post procedural ride home with .

## 2021-01-07 ENCOUNTER — TELEPHONE (OUTPATIENT)
Dept: PHYSICAL MEDICINE AND REHAB | Facility: MEDICAL CENTER | Age: 76
End: 2021-01-07

## 2021-01-07 NOTE — TELEPHONE ENCOUNTER
Spoke to Pt in regards on her SP that was done with Dr. Vazquez dated 1/4/2021 Diagnostic medial branch blocks targeting the BILATERAL L4-5 and L5-S1 facet joints with fluoroscopic guidance #1 and she mentioned that it wasn't working and she made an appt to discuss the 1st procedure in office.    Thank you  Uyen

## 2021-01-15 DIAGNOSIS — Z23 NEED FOR VACCINATION: ICD-10-CM

## 2021-01-23 ENCOUNTER — IMMUNIZATION (OUTPATIENT)
Dept: FAMILY PLANNING/WOMEN'S HEALTH CLINIC | Facility: IMMUNIZATION CENTER | Age: 76
End: 2021-01-23
Attending: INTERNAL MEDICINE
Payer: MEDICARE

## 2021-01-23 DIAGNOSIS — Z23 NEED FOR VACCINATION: ICD-10-CM

## 2021-01-23 DIAGNOSIS — Z23 ENCOUNTER FOR VACCINATION: Primary | ICD-10-CM

## 2021-01-23 PROCEDURE — 91300 PFIZER SARS-COV-2 VACCINE: CPT

## 2021-01-23 PROCEDURE — 0001A PFIZER SARS-COV-2 VACCINE: CPT

## 2021-01-27 ENCOUNTER — OFFICE VISIT (OUTPATIENT)
Dept: PHYSICAL MEDICINE AND REHAB | Facility: MEDICAL CENTER | Age: 76
End: 2021-01-27
Payer: MEDICARE

## 2021-01-27 VITALS
DIASTOLIC BLOOD PRESSURE: 80 MMHG | WEIGHT: 236.33 LBS | HEART RATE: 97 BPM | TEMPERATURE: 97.1 F | BODY MASS INDEX: 35.82 KG/M2 | RESPIRATION RATE: 16 BRPM | OXYGEN SATURATION: 97 % | HEIGHT: 68 IN | SYSTOLIC BLOOD PRESSURE: 100 MMHG

## 2021-01-27 DIAGNOSIS — M54.50 CHRONIC BILATERAL LOW BACK PAIN WITHOUT SCIATICA: ICD-10-CM

## 2021-01-27 DIAGNOSIS — M47.816 LUMBAR SPONDYLOSIS: ICD-10-CM

## 2021-01-27 DIAGNOSIS — Z98.890 HISTORY OF LUMBOSACRAL SPINE SURGERY: ICD-10-CM

## 2021-01-27 DIAGNOSIS — G89.29 CHRONIC BILATERAL LOW BACK PAIN WITHOUT SCIATICA: ICD-10-CM

## 2021-01-27 PROCEDURE — 99214 OFFICE O/P EST MOD 30 MIN: CPT | Performed by: PHYSICAL MEDICINE & REHABILITATION

## 2021-01-27 ASSESSMENT — PATIENT HEALTH QUESTIONNAIRE - PHQ9: CLINICAL INTERPRETATION OF PHQ2 SCORE: 0

## 2021-01-27 ASSESSMENT — PAIN SCALES - GENERAL: PAINLEVEL: 10=SEVERE PAIN

## 2021-01-28 ENCOUNTER — HOSPITAL ENCOUNTER (OUTPATIENT)
Facility: REHABILITATION | Age: 76
End: 2021-01-28
Attending: PHYSICAL MEDICINE & REHABILITATION | Admitting: PHYSICAL MEDICINE & REHABILITATION
Payer: MEDICARE

## 2021-01-28 NOTE — PROGRESS NOTES
Follow up patient note  Interventional spine and sports physiatry, Physical medicine rehabilitation      Chief complaint:   Chief Complaint   Patient presents with   • Follow-Up     Back pain          HISTORY    Please see new patient note by Dr Vazquez,  for more details.     HPI  Patient identification: Jovita Chaves ,  1945,   With Diagnoses of Lumbar spondylosis, Chronic bilateral low back pain without sciatica, and History of lumbosacral spine surgery done by Dr Yun were pertinent to this visit.     Procedures:  2021 diagnostic medial branch blocks targeting the bilateral L4-5 and L5-S1 facet joints.  Initially post procedure the patient had 100% pain relief.  By the time the patient went home there was minimal pain relief.  This will be called a negative block    She did have 100% pain relief with functional improvement minutes post procedure but by the time the patient got home her pain was near his baseline with no significant improvement.  She continues to have pain in her low back 8 out of 10 in intensity aching quality nonradiating bilateral however the left is significantly worse than the right.  Worse with lumbar extension and walking causing functional deficits.       ROS Red Flags :   Fever, Chills, Sweats: Denies  Involuntary Weight Loss: Denies  Bowel/Bladder Incontinence: Denies  Saddle Anesthesia: Denies        PMHx:   Past Medical History:   Diagnosis Date   • Arthritis     lower back   • Cervicalgia    • Hypothyroid    • Migraine without aura, with intractable migraine, so stated, without mention of status migrainosus    • Obesity    • Pain     lower back 8/10   • Thoracic or lumbosacral neuritis or radiculitis, unspecified     Bilateral L4/5   • Unspecified disorder of thyroid        PSHx:   Past Surgical History:   Procedure Laterality Date   • LUMBAR MEDIAL BRANCH BLOCKS Bilateral 2021    Procedure: BLOCK, NERVE, SPINAL, LUMBAR, POSTERIOR RAMUS, MEDIAL BRANCH;   Surgeon: Will Vazquez M.D.;  [facility];  Service: Pain Management   • IRRIGATION & DEBRIDEMENT NEURO  11/8/2010    Performed by JANET FLEIX at SURGERY [facility]   • LUMBAR LAMINECTOMY DISKECTOMY  9/27/2010    Performed by JANET FELIX at SURGERY [facility]   • LAMINOTOMY  9/27/2010    Performed by JANET FELIX at SURGERY [facility]   • OTHER  2010    epidural injections   • OTHER  2009    left ankle debridement   • OTHER  2006    right foot gusman osteotomy   • OTHER  2005    tendon transfer right foot   • CHOLECYSTECTOMY  1983   • CHOLECYSTECTOMY  1983   • OTHER  1982    left knee repair   • OTHER  1951    hysterectomy   • OTHER ORTHOPEDIC SURGERY      Left knee, right foot tendon, Left ankle debridement   • ROTATOR CUFF REPAIR  [id number]    rotator cuff, brachioplexus nerve       Family history   Family History   Problem Relation Age of Onset   • Cancer Mother         Colon   • Thyroid Mother         Hypothyroid   • Other Father         Migraine         Medications:   Outpatient Medications Marked as Taking for the 1/27/21 encounter (Office Visit) with Will Vazquez M.D.   Medication Sig Dispense Refill   • divalproex (DEPAKOTE) 250 MG Tablet Delayed Response      • rosuvastatin (CRESTOR) 10 MG Tab      • aspirin  MG Tablet Delayed Response Take 1 Tab by mouth every day. (Patient taking differently: Take 81 mg by mouth every day.) 100 Tab 3   • Omega-3 Fatty Acids (RA FISH OIL) 1400 MG CAPSULE DELAYED RELEASE Take 2 Caps by mouth every day. 60 Cap 3   • levothyroxine (SYNTHROID) 125 MCG Tab Take 125 mcg by mouth Every morning on an empty stomach.     • Cholecalciferol (VITAMIN D3) 5000 units Cap Take 1 Cap by mouth every day.     • sumatriptan (IMITREX) 100 MG tablet Take 1 Tab by mouth. 1 tab at headache onset; repeat in 1 hour prn. 9 Tab 6        Current Outpatient Medications on File Prior to Visit   Medication Sig Dispense Refill   •  divalproex (DEPAKOTE) 250 MG Tablet Delayed Response      • rosuvastatin (CRESTOR) 10 MG Tab      • aspirin  MG Tablet Delayed Response Take 1 Tab by mouth every day. (Patient taking differently: Take 81 mg by mouth every day.) 100 Tab 3   • Omega-3 Fatty Acids (RA FISH OIL) 1400 MG CAPSULE DELAYED RELEASE Take 2 Caps by mouth every day. 60 Cap 3   • levothyroxine (SYNTHROID) 125 MCG Tab Take 125 mcg by mouth Every morning on an empty stomach.     • Cholecalciferol (VITAMIN D3) 5000 units Cap Take 1 Cap by mouth every day.     • sumatriptan (IMITREX) 100 MG tablet Take 1 Tab by mouth. 1 tab at headache onset; repeat in 1 hour prn. 9 Tab 6     No current facility-administered medications on file prior to visit.          Allergies:   No Known Allergies    Social Hx:   Social History     Socioeconomic History   • Marital status:      Spouse name: Not on file   • Number of children: Not on file   • Years of education: Not on file   • Highest education level: Not on file   Occupational History   • Not on file   Social Needs   • Financial resource strain: Not on file   • Food insecurity     Worry: Not on file     Inability: Not on file   • Transportation needs     Medical: Not on file     Non-medical: Not on file   Tobacco Use   • Smoking status: Never Smoker   • Smokeless tobacco: Never Used   Substance and Sexual Activity   • Alcohol use: No   • Drug use: No   • Sexual activity: Not on file   Lifestyle   • Physical activity     Days per week: Not on file     Minutes per session: Not on file   • Stress: Not on file   Relationships   • Social connections     Talks on phone: Not on file     Gets together: Not on file     Attends [employer] service: Not on file     Active member of club or organization: Not on file     Attends meetings of clubs or organizations: Not on file     Relationship status: Not on file   • Intimate partner violence     Fear of current or ex partner: Not on file     Emotionally abused:  "Not on file     Physically abused: Not on file     Forced sexual activity: Not on file   Other Topics Concern   •  Service No   • Blood Transfusions Yes   • Caffeine Concern No   • Occupational Exposure No   • Hobby Hazards No   • Sleep Concern No   • Stress Concern No   • Weight Concern No   • Special Diet No   • Back Care No   • Exercise No   • Bike Helmet No   • Seat Belt Yes   • Self-Exams No   Social History Narrative   • Not on file         EXAMINATION     Physical Exam:   Vitals: /80 (BP Location: Right arm, Patient Position: Sitting, BP Cuff Size: Adult)   Pulse 97   Temp 36.2 °C (97.1 °F) (Temporal)   Resp 16   Ht 1.727 m (5' 8\")   Wt 107.2 kg (236 lb 5.3 oz)   SpO2 97%     Constitutional:   Body Habitus: Body mass index is 35.93 kg/m².  Cooperation: Fully cooperates with exam  Appearance: Well-groomed no disheveled    Respiratory-  breathing comfortable on room air, no audible wheezing  Cardiovascular- capillary refills less than 2 seconds. No lower extremity edema is noted.   Psychiatric- alert and oriented ×3. Normal affect.    MSK and Neuro: -  There are no signs of infection around the injection sites.   decreased active range of motion with flexion, lateral flexion, and rotation bilaterally.   There is decreased active range of motion with lumbar extension.    There is  pain with lumbar extension.   There is pain with facet loading maneuver (extension rotation) with axial low back pain on the BILATERAL side(s)    Palpation:   No tenderness to palpation in midline at T1-T12 levels. No tenderness to palpation in the left and right of the midline T1-L5, NEGATIVE for tenderness to palpation to the para-midline region in the lower lumbar levels.  palpation over SI joint: negative bilaterally    palpation in hip or over the gluteus medius tendon insertion: negative bilaterally      Lumbar spine Special tests  Neuro tension  Straight leg test negative bilaterally    Slump test negative " bilaterally      Key points for the international standards for neurological classification of spinal cord injury (ISNCSCI) to light touch.     Dermatome R L                                      L2 2 2   L3 2 2   L4 2 2   L5 2 2   S1 2 2   S2 2 2         Motor Exam Lower Extremities    ? Myotome R L   Hip flexion L2 5 5   Knee extension L3 5 5   Ankle dorsiflexion L4 5 5   Toe extension L5 5 5   Ankle plantarflexion S1 5 5                 MEDICAL DECISION MAKING    DATA    Labs:   Lab Results   Component Value Date/Time    SODIUM 137 12/01/2017 03:08 PM    POTASSIUM 3.5 (L) 12/01/2017 03:08 PM    CHLORIDE 103 12/01/2017 03:08 PM    CO2 24 12/01/2017 03:08 PM    GLUCOSE 104 (H) 12/01/2017 03:08 PM    BUN 19 12/01/2017 03:08 PM    CREATININE 0.85 12/01/2017 03:08 PM        Lab Results   Component Value Date/Time    PROTHROMBTM 12.5 12/01/2017 03:08 PM    INR 0.97 12/01/2017 03:08 PM        Lab Results   Component Value Date/Time    WBC 6.9 12/01/2017 03:08 PM    RBC 4.50 12/01/2017 03:08 PM    HEMOGLOBIN 14.3 12/01/2017 03:08 PM    HEMATOCRIT 42.3 12/01/2017 03:08 PM    MCV 94.0 12/01/2017 03:08 PM    MCH 31.8 12/01/2017 03:08 PM    MCHC 33.8 12/01/2017 03:08 PM    MPV 10.6 12/01/2017 03:08 PM    NEUTSPOLYS 54.90 12/01/2017 03:08 PM    LYMPHOCYTES 33.00 12/01/2017 03:08 PM    MONOCYTES 8.70 12/01/2017 03:08 PM    EOSINOPHILS 2.50 12/01/2017 03:08 PM    BASOPHILS 0.60 12/01/2017 03:08 PM        Lab Results   Component Value Date/Time    HBA1C 5.6 12/01/2017 03:28 PM          Imaging:   I personally reviewed following images    I reviewed the fluoroscopic images from 1/4/2021 showing successful diagnostic medial branch blocks targeting the bilateral L4-5 and L5-S1 facet joints.  I reviewed these with the patient at the visit on 1/27/2021.    I reviewed the following radiology reports           Results for orders placed during the hospital encounter of 12/01/17   MR-BRAIN-W/O    Impression MRI OF THE BRAIN WITHOUT  CONTRAST WITHIN NORMAL LIMITS.                                 Results for orders placed in visit on 12/10/20   MR-LUMBAR SPINE-W/O        Results for orders placed during the hospital encounter of 11/07/10   MR-LUMBAR SPINE-WITH & W/O            Results for orders placed in visit on 12/10/20   MR-LUMBAR SPINE-W/O                                                                                                                                                                                                        Results for orders placed in visit on 12/10/20   DX-LUMBAR SPINE-4+ VIEWS                                         DIAGNOSIS   Visit Diagnoses     ICD-10-CM   1. Lumbar spondylosis  M47.816   2. Chronic bilateral low back pain without sciatica  M54.5    G89.29   3. History of lumbosacral spine surgery done by Dr Yun  Z98.890         ASSESSMENT and PLAN:     Jovita Chaves  1945 female      Jovita was seen today for follow-up.    Diagnoses and all orders for this visit:    Lumbar spondylosis  -     REFERRAL TO PHYSICAL MEDICINE REHAB  -     REFERRAL TO PHYSICAL MEDICINE REHAB    Chronic bilateral low back pain without sciatica    History of lumbosacral spine surgery done by Dr Yun      Diagnostic medial branch blocks targeting the bilateral L4-5 and L5-S1 facet joints was difficult to interpret given the excellent pain relief minutes post procedure but this did not last throughout the diagnostic phase as expected.  I will call this a negative block.    The patient does still have imaging findings and clinical history and exam findings of facet mediated pain.  On imaging there is degeneration of the L3-4 facet joint as well.    I have ordered left diagnostic medial branch blocks targeting L3-4, L4-5, L5-S1 facet joints #1 and #2..    The risks benefits and alternatives to this procedure were discussed and the patient wishes to proceed with the procedure. Risks include but are not limited to  damage to surrounding structures, infection, bleeding, worsening of pain which can be permanent, weakness which can be permanent. Benefits include pain relief, improved function. Alternatives includes not doing the procedure.      If there this is a negative diagnostic blocks and I would consider the patient for spinal cord stimulation    Follow up: After the above diagnostic procedures    Thank you for allowing me to participate in the care of this patient. If you have any questions please not hesitate to contact me.             Please note that this dictation was created using voice recognition software. I have made every reasonable attempt to correct obvious errors but there may be errors of grammar and content that I may have overlooked prior to finalization of this note.      Will Vazquez MD  Interventional Spine and Sports Physiatry  Physical Medicine and Rehabilitation  [facility]

## 2021-01-28 NOTE — PATIENT INSTRUCTIONS
Your procedure will be at the [facility] special procedure suite.    [facility] [place] [address]       PRE-PROCEDURE INSTRUCTIONS  You may take your regular medications except:   · No Anti-inflammatories 5 days prior to your procedure. Anti-inflammatories include medicines such as  ibuprofen (Motrin, Advil), Excedrin, Naproxen (Aleve, Anaprox, Naprelan, Naprosyn), Celecoxib (Celebrex), Diclofenac (Voltaren-XR tab), and Meloxicam (Mobic).   · You can take the remainder of your pain medications as prescribed.   · If you are having a diagnostic procedure such as a medial branch block, do not use her pain meds on the day of the procedure  · No Glucophage or Metformin 24 hours before your procedure. You may resume next day after your procedure.  · Call the physiatry office if you are taking or prescribed anti-biotics within five days of procedure.  · Please ask provider if you are taking any new diabetes medication.  · CONTINUE TAKING BLOOD PRESSURE MEDICATIONS AS PRESCRIBED.  · Pain medications will not be prescribed on the procedure day. Procedural pain medication may be used by your provider   · Call your doctor's office performing the procedure if you have a fever, chills, rash or new illness prior to your procedure    Anticoagulation/antiplatelet medications  No Blood thinning medications such as Coumadin or Plavix 5 days prior to procedure unless your doctor said to continue these medications. Call your doctor if a new medication is prescribed in this class.     Restrictions for eating before procedure:   · If you are getting procedural sedation, then do not eat to for 8 hours prior to procedure appointment time. Do not drink fluids for four hours prior to your procedure time.   · If you are not having procedural sedation, then Skip the meal prior to your procedure. If you have a morning procedure then skip breakfast. If you have an afternoon procedure then skip lunch.   · You may drink clear  liquids up to 2 hours prior to your procedure  · You must have a  the day of procedure to accompany you home.      POST PROCEDURE INSTRUCTIONS   · No heavy lifting, strenuous bending or strenuous exercise for 3 days after your procedure.  · No hot tubs, baths, swimming for 3 days after your procedure  · You can remove the bandage the day after the procedure.  · IF YOU RECEIVED A DIAGNOSTIC PROCEDURE (SUCH AS A MEDIAL BRANCH BLOCK), PLEASE NOTE THAT WE DO EXPECT THIS INJECTION TO WEAR OFF.  IT IS IMPORTANT TO COMPLETE THE PAIN DIARY AND LIST THE PAIN SCORE ONLY FOR THE REGION WHERE THE PROCEDURE WAS AND BRING THIS TO YOUR FOLLOW UP VISIT.  · IF YOU EXPERIENCE PROLONGED WEAKNESS LONGER THAN ONE DAY, BOWEL OR BLADDER INCONTINENCE THEN PLEASE CALL THE PHYSIATRY OFFICE.  · Your leg may feel heavy, weak and numb for up to 1-2 days. Be very careful walking.   ·  You may resume normal activities 3 days after procedure.

## 2021-02-08 ENCOUNTER — OFFICE VISIT (OUTPATIENT)
Dept: PHYSICAL MEDICINE AND REHAB | Facility: MEDICAL CENTER | Age: 76
End: 2021-02-08
Payer: MEDICARE

## 2021-02-08 ENCOUNTER — HOSPITAL ENCOUNTER (OUTPATIENT)
Dept: RADIOLOGY | Facility: MEDICAL CENTER | Age: 76
End: 2021-02-08
Attending: PHYSICAL MEDICINE & REHABILITATION
Payer: MEDICARE

## 2021-02-08 VITALS
SYSTOLIC BLOOD PRESSURE: 112 MMHG | DIASTOLIC BLOOD PRESSURE: 72 MMHG | TEMPERATURE: 96.8 F | HEART RATE: 88 BPM | BODY MASS INDEX: 35.88 KG/M2 | WEIGHT: 236.77 LBS | HEIGHT: 68 IN | OXYGEN SATURATION: 97 %

## 2021-02-08 DIAGNOSIS — R07.81 RIB PAIN ON LEFT SIDE: ICD-10-CM

## 2021-02-08 DIAGNOSIS — G89.29 CHRONIC BILATERAL LOW BACK PAIN WITHOUT SCIATICA: ICD-10-CM

## 2021-02-08 DIAGNOSIS — Z98.890 HISTORY OF LUMBOSACRAL SPINE SURGERY: ICD-10-CM

## 2021-02-08 DIAGNOSIS — M54.50 CHRONIC BILATERAL LOW BACK PAIN WITHOUT SCIATICA: ICD-10-CM

## 2021-02-08 DIAGNOSIS — M47.816 LUMBAR SPONDYLOSIS: ICD-10-CM

## 2021-02-08 PROCEDURE — 99214 OFFICE O/P EST MOD 30 MIN: CPT | Performed by: PHYSICAL MEDICINE & REHABILITATION

## 2021-02-08 PROCEDURE — 71101 X-RAY EXAM UNILAT RIBS/CHEST: CPT | Mod: LT

## 2021-02-08 ASSESSMENT — PAIN SCALES - GENERAL: PAINLEVEL: 10=SEVERE PAIN

## 2021-02-08 ASSESSMENT — PATIENT HEALTH QUESTIONNAIRE - PHQ9: CLINICAL INTERPRETATION OF PHQ2 SCORE: 0

## 2021-02-08 NOTE — PREPROCEDURE INSTRUCTIONS
PAT note: PAT call made 02/08/2021 at 1247. Spoke with Jovita. Health history, allergies, medications and pre-procedure/sedation instructions reviewed with patient. Pre-procedure respiratory screening completed. Pt denies being sick/symptomatic(fever, cough, shortness of breath)  within 72 hours or having been in close contact with symptomatic individuals in the past 2 weeks.Pt was informed to contact his/her physician should he/she or any close personal contact become symptomatic prior to the procedure. Pt was told to bring a mask as he/she would be wearing it during the entire stay. It was recommended that the pt self isolate 72 hrs before the procedure and  recommend that his/her  remain on site til pt is d/christopher. Pt verbalized understanding of instructions.

## 2021-02-09 ENCOUNTER — APPOINTMENT (OUTPATIENT)
Dept: RADIOLOGY | Facility: REHABILITATION | Age: 76
End: 2021-02-09
Attending: PHYSICAL MEDICINE & REHABILITATION
Payer: MEDICARE

## 2021-02-09 ENCOUNTER — HOSPITAL ENCOUNTER (OUTPATIENT)
Facility: REHABILITATION | Age: 76
End: 2021-02-09
Attending: PHYSICAL MEDICINE & REHABILITATION | Admitting: PHYSICAL MEDICINE & REHABILITATION
Payer: MEDICARE

## 2021-02-09 VITALS
HEART RATE: 82 BPM | OXYGEN SATURATION: 98 % | DIASTOLIC BLOOD PRESSURE: 62 MMHG | BODY MASS INDEX: 35.72 KG/M2 | HEIGHT: 68 IN | SYSTOLIC BLOOD PRESSURE: 118 MMHG | WEIGHT: 235.67 LBS | RESPIRATION RATE: 16 BRPM

## 2021-02-09 PROCEDURE — 700101 HCHG RX REV CODE 250

## 2021-02-09 PROCEDURE — 64495 INJ PARAVERT F JNT L/S 3 LEV: CPT

## 2021-02-09 PROCEDURE — 700111 HCHG RX REV CODE 636 W/ 250 OVERRIDE (IP)

## 2021-02-09 PROCEDURE — 700117 HCHG RX CONTRAST REV CODE 255

## 2021-02-09 PROCEDURE — 64493 INJ PARAVERT F JNT L/S 1 LEV: CPT

## 2021-02-09 PROCEDURE — 64494 INJ PARAVERT F JNT L/S 2 LEV: CPT

## 2021-02-09 RX ORDER — LIDOCAINE HYDROCHLORIDE 10 MG/ML
INJECTION, SOLUTION EPIDURAL; INFILTRATION; INTRACAUDAL; PERINEURAL
Status: COMPLETED
Start: 2021-02-09 | End: 2021-02-09

## 2021-02-09 RX ORDER — ONDANSETRON 2 MG/ML
4 INJECTION INTRAMUSCULAR; INTRAVENOUS
Status: DISCONTINUED | OUTPATIENT
Start: 2021-02-09 | End: 2021-02-09 | Stop reason: HOSPADM

## 2021-02-09 RX ORDER — LIDOCAINE HYDROCHLORIDE 20 MG/ML
INJECTION, SOLUTION EPIDURAL; INFILTRATION; INTRACAUDAL; PERINEURAL
Status: COMPLETED
Start: 2021-02-09 | End: 2021-02-09

## 2021-02-09 RX ADMIN — LIDOCAINE HYDROCHLORIDE 10 ML: 10 INJECTION, SOLUTION EPIDURAL; INFILTRATION; INTRACAUDAL; PERINEURAL at 10:17

## 2021-02-09 RX ADMIN — LIDOCAINE HYDROCHLORIDE 5 ML: 20 INJECTION, SOLUTION EPIDURAL; INFILTRATION; INTRACAUDAL; PERINEURAL at 10:20

## 2021-02-09 RX ADMIN — IOHEXOL 5 ML: 240 INJECTION, SOLUTION INTRATHECAL; INTRAVASCULAR; INTRAVENOUS; ORAL at 10:19

## 2021-02-09 ASSESSMENT — PAIN DESCRIPTION - PAIN TYPE: TYPE: CHRONIC PAIN

## 2021-02-09 NOTE — NON-PROVIDER
Accompanied   by RN  from procedure room ambulatory .Fluids and food tolerated well. Ice compress applied to procedure site. Reviewed  discharged home care and pain diary   instruction given and understood by patient. Post procedure evaluated  by Dr. Vazquez . Discharged ambulatory  without difficulty or any deficits with  designated .

## 2021-02-09 NOTE — NON-PROVIDER
Name verified. Consent for procedure and site confirmed and signed.H&P updated.Current medications , allergies reviewed . Printed home care discharged  verbal instructions given and  she  verbalized understanding. Pertinent medical health information  reviewed  (TIA, arthritis, hypothyroid,  ) . Confirmed  waiting. Aspirin 81 mg.  had yesterday and was OK'ed by Dr. Vazquez per HETAL Merino RN . Communicated to Dr. Vazquez  and FELICE Adams RN.

## 2021-02-09 NOTE — OP REPORT
Date of Service: 2/9/2021     Patient: Jovita Chaves 75 y.o. female     MRN: [id number]     Physician/s: Will Vazquez MD    Pre-operative Diagnosis: Lumbosacral spondylosis, facet arthropathy. The patient was NOT seen for lumbar radiculopathy today.     Post-operative Diagnosis: Lumbosacral spondylosis, facet arthropathy. The patient was NOT seen for lumbar radiculopathy today.     Procedure: Medial Branch Blocks targeting the left L3-4, L4-5 and L5-S1  facet joints     Description of procedure:    The risks, benefits, and alternatives of the procedure were reviewed and discussed with the patient.  Written informed consent was freely obtained. A pre-procedural time-out was conducted by the physician verifying patient’s identity, procedure to be performed, procedure site and side, and allergy verification. Appropriate equipment was determined to be in place for the procedure.         In the fluoroscopy suite the patient was placed in a prone position and the skin was prepped and draped in the usual sterile fashion. The fluoroscope was placed over the lower back at the appropriate angles, and the targets for injection were marked. A 27g needle was placed into each of the markings at the levels below, and approx 1cc of 1% Lidocaine was injected subcutaneously into the epidermal and dermal layers. The needle was removed intact.     Using an oblique view, A 22g 5 inch needle was then placed at the intersection of the transverse process and superior articular process at the S1 facet where the L5 dorsal ramus runs on the left side. The needle tips were then verified by AP, oblique, and lateral views.     Using an oblique view, A 22g 5 inch needle was then placed at the intersection of the transverse process and superior articular process at the L5-S1 facet joint where the L4 medial branch runs  on the left side. The needle tips were then verified by AP, oblique, and lateral views.     Using an oblique view, A  22g 5 inch needle was then placed at the intersection of the transverse process and superior articular process at the L4-5 facet joint where the L3 medial branch runs  on the left side. The needle tips were then verified by AP, oblique, and lateral views.     Using an oblique view, A 22g 5 inch needle was then placed at the intersection of the transverse process and superior articular process at the L3-4 facet joint where the L2 medial branch runs  on the left side. The needle tips were then verified by AP, oblique, and lateral views.          In the AP view, less than 1 cc of contrast dye was used to highlight the medial branch while the fluoroscope was running live at the levels above. Following negative aspiration, approximately 1mL of 2% lidocaine  was then injected at the above levels, and the needles were removed intact after restyleted. The patient's back was covered with a 4x4 gauze, the area was cleansed with sterile normal saline, and a dressing was applied.     This was a challenging procedure given the patients BMI 36 with central obesity. This required longer needles.  A typical procedure such as this would require 25g 3.5 inch needles.  This procedure required 22g 5 inch needles.  This added to the mental effort for complexity this procedure.  The lateral view required multiple attempts with increasing KVP to obtain images.  This also made acquiring fluoroscopic images more time-consuming and challenging.  Final fluoroscopic images were obtained and saved.     There were no complications noted, the patient was hemodynamically stable, and tolerated the procedure well.     The patient had 100% pain relief of the index pain minutes post procedure.    Preprocedure pain 9/10  Postprocedure pain 0/10    The patient also was given a pain diary    Follow-up as scheduled      Will Vazquez MD  Physical Medicine and Rehabilitation  Interventional Spine and Sports Physiatry  [facility]             CPT  Intraarticular joint or medial branch block (MBB) - lumbar or sacral (1st level):  83151-82  Intraarticular joint or medial branch block (MBB) - lumbar or sacral (2nd level):  28512-44  Intraarticular joint or medial branch block (MBB) - lumbar or sacral (3rd level):  01641-70

## 2021-02-09 NOTE — H&P (VIEW-ONLY)
Follow up patient note  Interventional spine and sports physiatry, Physical medicine rehabilitation      Chief complaint:   Chief Complaint   Patient presents with   • Follow-Up     Back pain          HISTORY    Please see new patient note by Dr Vazquez,  for more details.     HPI  Patient identification: Jovita Chaves ,  1945,   With Diagnoses of Rib pain on left side, Lumbar spondylosis, History of lumbosacral spine surgery done by Dr Yun, and Chronic bilateral low back pain without sciatica were pertinent to this visit.     Procedures:  2021 diagnostic medial branch blocks targeting the bilateral L4-5 and L5-S1 facet joints.  Initially post procedure the patient had 100% pain relief.  By the time the patient went home there was minimal pain relief.  This will be called a negative block    The patient has chronic axial left-sided low back pain which is worse on the right side.  This pain can be severe in intensity intermittent worse with standing bending and lumbar extension.  Nonradiating.  She has upcoming diagnostic medial branch blocks.    Over the past 10 days the patient has developed pain in the left mid ribs.  Nonradiating.  No pain in the midline in the thoracic spine.  This is been worsening.  This is up to 10 out of 10 in intensity.  Worse with breathing.  Denies rashes.  Denies trauma.  She denies chest pain cough fevers chills or other signs of URI. She denies leg pain and swelling.        ROS Red Flags :   Fever, Chills, Sweats: Denies  Involuntary Weight Loss: Denies  Bowel/Bladder Incontinence: Denies  Saddle Anesthesia: Denies        PMHx:   Past Medical History:   Diagnosis Date   • Arthritis     lower back   • Cervicalgia    • Hypothyroid    • Migraine without aura, with intractable migraine, so stated, without mention of status migrainosus    • Obesity    • Pain     lower back 8/10   • Thoracic or lumbosacral neuritis or radiculitis, unspecified     Bilateral L4/5   •  Unspecified disorder of thyroid        PSHx:   Past Surgical History:   Procedure Laterality Date   • LUMBAR MEDIAL BRANCH BLOCKS Bilateral 1/4/2021    Procedure: BLOCK, NERVE, SPINAL, LUMBAR, POSTERIOR RAMUS, MEDIAL BRANCH;  Surgeon: Will Vazquez M.D.;  [facility];  Service: Pain Management   • IRRIGATION & DEBRIDEMENT NEURO  11/8/2010    Performed by JANET FELIX at [facility]   • LUMBAR LAMINECTOMY DISKECTOMY  9/27/2010    Performed by JANET FELIX at [facility]   • LAMINOTOMY  9/27/2010    Performed by JANET FELIX at [facility]   • OTHER  2010    epidural injections   • OTHER  2009    left ankle debridement   • OTHER  2006    right foot gusman osteotomy   • OTHER  2005    tendon transfer right foot   • CHOLECYSTECTOMY  1983   • CHOLECYSTECTOMY  1983   • OTHER  1982    left knee repair   • OTHER  1951    hysterectomy   • OTHER ORTHOPEDIC SURGERY      Left knee, right foot tendon, Left ankle debridement   • ROTATOR CUFF REPAIR  [id number]    rotator cuff, brachioplexus nerve       Family history   Family History   Problem Relation Age of Onset   • Cancer Mother         Colon   • Thyroid Mother         Hypothyroid   • Other Father         Migraine         Medications:   Outpatient Medications Marked as Taking for the 2/8/21 encounter (Office Visit) with Will Vazquez M.D.   Medication Sig Dispense Refill   • divalproex (DEPAKOTE) 250 MG Tablet Delayed Response      • rosuvastatin (CRESTOR) 10 MG Tab      • aspirin  MG Tablet Delayed Response Take 1 Tab by mouth every day. (Patient taking differently: Take 81 mg by mouth every day.) 100 Tab 3   • Omega-3 Fatty Acids (RA FISH OIL) 1400 MG CAPSULE DELAYED RELEASE Take 2 Caps by mouth every day. 60 Cap 3   • levothyroxine (SYNTHROID) 125 MCG Tab Take 125 mcg by mouth Every morning on an empty stomach.     • Cholecalciferol (VITAMIN D3) 5000 units Cap Take 1 Cap by mouth every day.      • sumatriptan (IMITREX) 100 MG tablet Take 1 Tab by mouth. 1 tab at headache onset; repeat in 1 hour prn. 9 Tab 6        Current Outpatient Medications on File Prior to Visit   Medication Sig Dispense Refill   • divalproex (DEPAKOTE) 250 MG Tablet Delayed Response      • rosuvastatin (CRESTOR) 10 MG Tab      • aspirin  MG Tablet Delayed Response Take 1 Tab by mouth every day. (Patient taking differently: Take 81 mg by mouth every day.) 100 Tab 3   • Omega-3 Fatty Acids (RA FISH OIL) 1400 MG CAPSULE DELAYED RELEASE Take 2 Caps by mouth every day. 60 Cap 3   • levothyroxine (SYNTHROID) 125 MCG Tab Take 125 mcg by mouth Every morning on an empty stomach.     • Cholecalciferol (VITAMIN D3) 5000 units Cap Take 1 Cap by mouth every day.     • sumatriptan (IMITREX) 100 MG tablet Take 1 Tab by mouth. 1 tab at headache onset; repeat in 1 hour prn. 9 Tab 6     No current facility-administered medications on file prior to visit.          Allergies:   No Known Allergies    Social Hx:   Social History     Socioeconomic History   • Marital status:      Spouse name: Not on file   • Number of children: Not on file   • Years of education: Not on file   • Highest education level: Not on file   Occupational History   • Not on file   Social Needs   • Financial resource strain: Not on file   • Food insecurity     Worry: Not on file     Inability: Not on file   • Transportation needs     Medical: Not on file     Non-medical: Not on file   Tobacco Use   • Smoking status: Never Smoker   • Smokeless tobacco: Never Used   Substance and Sexual Activity   • Alcohol use: No   • Drug use: No   • Sexual activity: Not on file   Lifestyle   • Physical activity     Days per week: Not on file     Minutes per session: Not on file   • Stress: Not on file   Relationships   • Social connections     Talks on phone: Not on file     Gets together: Not on file     Attends L[employer]-day service: Not on file     Active member of club or  "organization: Not on file     Attends meetings of clubs or organizations: Not on file     Relationship status: Not on file   • Intimate partner violence     Fear of current or ex partner: Not on file     Emotionally abused: Not on file     Physically abused: Not on file     Forced sexual activity: Not on file   Other Topics Concern   •  Service No   • Blood Transfusions Yes   • Caffeine Concern No   • Occupational Exposure No   • Hobby Hazards No   • Sleep Concern No   • Stress Concern No   • Weight Concern No   • Special Diet No   • Back Care No   • Exercise No   • Bike Helmet No   • Seat Belt Yes   • Self-Exams No   Social History Narrative   • Not on file         EXAMINATION     Physical Exam:   Vitals: /72 (BP Location: Left arm, Patient Position: Sitting, BP Cuff Size: Adult)   Pulse 88   Temp 36 °C (96.8 °F) (Temporal)   Ht 1.727 m (5' 8\")   Wt 107 kg (236 lb 12.4 oz)   SpO2 97%     Constitutional:   Body Habitus: Body mass index is 36 kg/m².  Cooperation: Fully cooperates with exam  Appearance: Well-groomed no disheveled    Respiratory-  breathing comfortable on room air, no audible wheezing  Cardiovascular- capillary refills less than 2 seconds. No lower extremity edema is noted.   Psychiatric- alert and oriented ×3. Normal affect.    MSK and Neuro: -  There is no tenderness in the midline throughout the thoracic spine.  There is tenderness to palpation in the mid left ribs.  Mostly in the posterior and posterior lateral aspect.  No pain on the right ribs.  No rashes are present.          MEDICAL DECISION MAKING    DATA    Labs:   Lab Results   Component Value Date/Time    SODIUM 137 12/01/2017 03:08 PM    POTASSIUM 3.5 (L) 12/01/2017 03:08 PM    CHLORIDE 103 12/01/2017 03:08 PM    CO2 24 12/01/2017 03:08 PM    GLUCOSE 104 (H) 12/01/2017 03:08 PM    BUN 19 12/01/2017 03:08 PM    CREATININE 0.85 12/01/2017 03:08 PM        Lab Results   Component Value Date/Time    PROTHROMBTM 12.5 " 12/01/2017 03:08 PM    INR 0.97 12/01/2017 03:08 PM        Lab Results   Component Value Date/Time    WBC 6.9 12/01/2017 03:08 PM    RBC 4.50 12/01/2017 03:08 PM    HEMOGLOBIN 14.3 12/01/2017 03:08 PM    HEMATOCRIT 42.3 12/01/2017 03:08 PM    MCV 94.0 12/01/2017 03:08 PM    MCH 31.8 12/01/2017 03:08 PM    MCHC 33.8 12/01/2017 03:08 PM    MPV 10.6 12/01/2017 03:08 PM    NEUTSPOLYS 54.90 12/01/2017 03:08 PM    LYMPHOCYTES 33.00 12/01/2017 03:08 PM    MONOCYTES 8.70 12/01/2017 03:08 PM    EOSINOPHILS 2.50 12/01/2017 03:08 PM    BASOPHILS 0.60 12/01/2017 03:08 PM        Lab Results   Component Value Date/Time    HBA1C 5.6 12/01/2017 03:28 PM          Imaging:   I personally reviewed following images    I reviewed the fluoroscopic images from 1/4/2021 showing successful diagnostic medial branch blocks targeting the bilateral L4-5 and L5-S1 facet joints.  I reviewed these with the patient at the visit on 1/27/2021.    I reviewed the following radiology reports           Results for orders placed during the hospital encounter of 12/01/17   MR-BRAIN-W/O    Impression MRI OF THE BRAIN WITHOUT CONTRAST WITHIN NORMAL LIMITS.                                 Results for orders placed in visit on 12/10/20   MR-LUMBAR SPINE-W/O        Results for orders placed during the hospital encounter of 11/07/10   MR-LUMBAR SPINE-WITH & W/O            Results for orders placed in visit on 12/10/20   MR-LUMBAR SPINE-W/O                                                                                                                                                                                                        Results for orders placed in visit on 12/10/20   DX-LUMBAR SPINE-4+ VIEWS                                         DIAGNOSIS   Visit Diagnoses     ICD-10-CM   1. Rib pain on left side  R07.81   2. Lumbar spondylosis  M47.816   3. History of lumbosacral spine surgery done by Dr Yun  Z98.890   4. Chronic bilateral low back pain  without sciatica  M54.5    G89.29         ASSESSMENT and PLAN:     Jovita Chaves  1945 female      Jovita was seen today for follow-up.    Diagnoses and all orders for this visit:    Rib pain on left side  -     [id number] (WITH 1-VIEW CXR) LEFT; Future    Lumbar spondylosis    History of lumbosacral spine surgery done by Dr Yun    Chronic bilateral low back pain without sciatica           The patient has had new onset pain which is unrelated to her lumbar spondylosis.  There are no signs of thoracic compression fractures or injury to the thoracic spine.  She does have pain along the left mid ribs with tenderness to palpation.  There was no trauma to suggest fracture.  No rash to suggest shingles.  However the patient does have pain with breathing.  Because of this I have ordered an x-ray of the chest as well as x-rays of the left ribs.  I advised the patient to follow-up in urgent care today.    As long as there are no issues on the x-ray ordered as well as with the urgent care visit, plan to proceed with left diagnostic medial branch blocks targeting L3-4, L4-5, L5-S1 facet joints #1 and #2..    The risks benefits and alternatives to this procedure were discussed and the patient wishes to proceed with the procedure. Risks include but are not limited to damage to surrounding structures, infection, bleeding, worsening of pain which can be permanent, weakness which can be permanent. Benefits include pain relief, improved function. Alternatives includes not doing the procedure.      If there this is a negative diagnostic blocks and I would consider the patient for spinal cord stimulation    Follow up: After the above diagnostic procedures    Thank you for allowing me to participate in the care of this patient. If you have any questions please not hesitate to contact me.             Please note that this dictation was created using voice recognition software. I have made every reasonable  attempt to correct obvious errors but there may be errors of grammar and content that I may have overlooked prior to finalization of this note.      Will Vazquez MD  Interventional Spine and Sports Physiatry  Physical Medicine and Rehabilitation  [facility]

## 2021-02-09 NOTE — PROGRESS NOTES
Preprocedure assessment completed.  Pertinent health information, hx TIAs communicated to physician and staff during time out.  Patient positioned preprocedure by RN, CST and XRAY.  Pillow under ankles for support.

## 2021-02-09 NOTE — OR SURGEON
Immediate Post OP Note    PreOp Diagnosis: Lumbar spondylosis    PostOp Diagnosis: Lumbar spondylosis    Procedure(s):  BLOCK, NERVE, SPINAL, LUMBAR, POSTERIOR RAMUS, MEDIAL BRANCH - Wound Class: Clean    Surgeon(s):  Will Vazquez M.D.    Anesthesiologist/Type of Anesthesia:  No anesthesia staff entered./Local    Surgical Staff:  Circulator: Amparo Adams R.N.  Scrub Person: Yelena Fitzgerald  Radiology Technologist: Chemo Freeman    Specimens removed if any:  * No specimens in log *    Estimated Blood Loss: None    Findings: None    Complications: None        2/9/2021 10:28 AM Will Vazquez M.D.

## 2021-02-10 ENCOUNTER — TELEPHONE (OUTPATIENT)
Dept: PHYSICAL MEDICINE AND REHAB | Facility: MEDICAL CENTER | Age: 76
End: 2021-02-10

## 2021-02-10 ENCOUNTER — OFFICE VISIT (OUTPATIENT)
Dept: PHYSICAL MEDICINE AND REHAB | Facility: MEDICAL CENTER | Age: 76
End: 2021-02-10
Payer: MEDICARE

## 2021-02-10 VITALS
HEART RATE: 111 BPM | OXYGEN SATURATION: 99 % | BODY MASS INDEX: 36.29 KG/M2 | WEIGHT: 239.42 LBS | SYSTOLIC BLOOD PRESSURE: 120 MMHG | DIASTOLIC BLOOD PRESSURE: 70 MMHG | HEIGHT: 68 IN | TEMPERATURE: 96.8 F

## 2021-02-10 DIAGNOSIS — Z98.890 HISTORY OF LUMBOSACRAL SPINE SURGERY: ICD-10-CM

## 2021-02-10 DIAGNOSIS — S22.32XA CLOSED FRACTURE OF ONE RIB OF LEFT SIDE, INITIAL ENCOUNTER: ICD-10-CM

## 2021-02-10 DIAGNOSIS — M47.816 LUMBAR SPONDYLOSIS: ICD-10-CM

## 2021-02-10 DIAGNOSIS — M96.1 POST LAMINECTOMY SYNDROME: ICD-10-CM

## 2021-02-10 DIAGNOSIS — M54.50 CHRONIC BILATERAL LOW BACK PAIN WITHOUT SCIATICA: ICD-10-CM

## 2021-02-10 DIAGNOSIS — G89.29 CHRONIC BILATERAL LOW BACK PAIN WITHOUT SCIATICA: ICD-10-CM

## 2021-02-10 DIAGNOSIS — R07.81 RIB PAIN ON LEFT SIDE: ICD-10-CM

## 2021-02-10 PROCEDURE — 99214 OFFICE O/P EST MOD 30 MIN: CPT | Performed by: PHYSICAL MEDICINE & REHABILITATION

## 2021-02-10 RX ORDER — NALOXONE HYDROCHLORIDE 4 MG/.1ML
1 SPRAY NASAL PRN
Qty: 1 EACH | Refills: 0 | Status: SHIPPED | OUTPATIENT
Start: 2021-02-10 | End: 2021-05-24

## 2021-02-10 RX ORDER — HYDROCODONE BITARTRATE AND ACETAMINOPHEN 5; 325 MG/1; MG/1
1 TABLET ORAL EVERY 8 HOURS PRN
Qty: 18 TABLET | Refills: 0 | Status: SHIPPED | OUTPATIENT
Start: 2021-02-10 | End: 2021-02-16

## 2021-02-10 ASSESSMENT — PAIN SCALES - GENERAL: PAINLEVEL: 9=SEVERE PAIN

## 2021-02-10 NOTE — TELEPHONE ENCOUNTER
Spoke to Pt about the Diagnostic medial branch blocks targeting the LEFT L3-4, L4-5 and L5-S1 facet joints with fluoroscopic guidance #1 that was done 2/9/21 and Pt stated that it did not work and that she was in a lot of pain, her pain level is at a 9. when Pt was unloading the  and she would give herself at least 30 minutes break to see If the pain would go down, but the pain would just come back again when moving around the house.      Please advise

## 2021-02-10 NOTE — PROGRESS NOTES
Follow up patient note  Interventional spine and sports physiatry, Physical medicine rehabilitation      Chief complaint:   Chief Complaint   Patient presents with   • Follow-Up     Back pain          HISTORY    Please see new patient note by Dr Vazquez,  for more details.     HPI  Patient identification: Jovita Chaves ,  1945,   With Diagnoses of Post laminectomy syndrome, Chronic bilateral low back pain without sciatica, History of lumbosacral spine surgery done by Dr Yun, Lumbar spondylosis, Rib pain on left side, and Closed fracture of one rib of left side, initial encounter were pertinent to this visit.     Procedures:  2021 diagnostic medial branch blocks targeting the bilateral L4-5 and L5-S1 facet joints.  Initially post procedure the patient had 100% pain relief.  By the time the patient went home there was minimal pain relief.  This will be called a negative block  2021 Medial Branch Blocks targeting the left L3-4, L4-5 and L5-S1  facet joints with 100% pain relief minutes post procedure however during the diagnostic phase the pain returned back to pre procedure pain with walking, standing and ADLs. This will be called a negative block.         Overall negative block as described above.  No significant pain relief after the patient got home after the procedure.  She continues to have severe 9-10/10 in intensity pain in the low back nonradiating bilateral left worse than right worse with bending twisting, ADLs including upper body lower body dressing doing the dishes.  She has failed multiple conservative treatments including physical therapy, previous surgery, injections, medication management for greater than 6 months.      She is also having pain in the left side of the ribs secondary to a spontaneous rib fracture and she states she will follow up with her PCP with this for evaluation of bone health.     ROS Red Flags :   Fever, Chills, Sweats: Denies  Involuntary Weight  Loss: Denies  Bowel/Bladder Incontinence: Denies  Saddle Anesthesia: Denies        PMHx:   Past Medical History:   Diagnosis Date   • Arthritis     lower back   • Cervicalgia    • Hypothyroid    • Migraine without aura, with intractable migraine, so stated, without mention of status migrainosus    • Obesity    • Pain     lower back 8/10   • Thoracic or lumbosacral neuritis or radiculitis, unspecified     Bilateral L4/5   • Unspecified disorder of thyroid        PSHx:   Past Surgical History:   Procedure Laterality Date   • LUMBAR MEDIAL BRANCH BLOCKS Bilateral 1/4/2021    Procedure: BLOCK, NERVE, SPINAL, LUMBAR, POSTERIOR RAMUS, MEDIAL BRANCH;  Surgeon: Will Vazquez M.D.;  [facility];  Service: Pain Management   • IRRIGATION & DEBRIDEMENT NEURO  11/8/2010    Performed by JANET FELIX at SURGERY [facility]   • LUMBAR LAMINECTOMY DISKECTOMY  9/27/2010    Performed by JANET FELIX at SURGERY [facility]   • LAMINOTOMY  9/27/2010    Performed by JANET FELIX at SURGERY [facility]   • OTHER  2010    epidural injections   • OTHER  2009    left ankle debridement   • OTHER  2006    right foot gusman osteotomy   • OTHER  2005    tendon transfer right foot   • CHOLECYSTECTOMY  1983   • CHOLECYSTECTOMY  1983   • OTHER  1982    left knee repair   • OTHER  1951    hysterectomy   • OTHER ORTHOPEDIC SURGERY      Left knee, right foot tendon, Left ankle debridement   • ROTATOR CUFF REPAIR  9886-0792    rotator cuff, brachioplexus nerve       Family history   Family History   Problem Relation Age of Onset   • Cancer Mother         Colon   • Thyroid Mother         Hypothyroid   • Other Father         Migraine         Medications:   Outpatient Medications Marked as Taking for the 2/10/21 encounter (Office Visit) with Will Vazquez M.D.   Medication Sig Dispense Refill   • HYDROcodone-acetaminophen (NORCO) 5-325 MG Tab per tablet Take 1 tablet by mouth every 8 hours as needed  (moderate to severe pain) for up to 6 days. 18 tablet 0   • Naloxone (NARCAN) 4 MG/0.1ML Liquid Administer 4 mg into affected nostril(S) as needed (For severe sleepiness or difficulty breathing from possible overdose. Call 911 after administration.). 1 Each 0   • divalproex (DEPAKOTE) 250 MG Tablet Delayed Response      • rosuvastatin (CRESTOR) 10 MG Tab      • Omega-3 Fatty Acids (RA FISH OIL) 1400 MG CAPSULE DELAYED RELEASE Take 2 Caps by mouth every day. 60 Cap 3   • levothyroxine (SYNTHROID) 125 MCG Tab Take 125 mcg by mouth Every morning on an empty stomach.     • Cholecalciferol (VITAMIN D3) 5000 units Cap Take 1 Cap by mouth every day.     • sumatriptan (IMITREX) 100 MG tablet Take 1 Tab by mouth. 1 tab at headache onset; repeat in 1 hour prn. 9 Tab 6        Current Outpatient Medications on File Prior to Visit   Medication Sig Dispense Refill   • divalproex (DEPAKOTE) 250 MG Tablet Delayed Response      • rosuvastatin (CRESTOR) 10 MG Tab      • Omega-3 Fatty Acids (RA FISH OIL) 1400 MG CAPSULE DELAYED RELEASE Take 2 Caps by mouth every day. 60 Cap 3   • levothyroxine (SYNTHROID) 125 MCG Tab Take 125 mcg by mouth Every morning on an empty stomach.     • Cholecalciferol (VITAMIN D3) 5000 units Cap Take 1 Cap by mouth every day.     • sumatriptan (IMITREX) 100 MG tablet Take 1 Tab by mouth. 1 tab at headache onset; repeat in 1 hour prn. 9 Tab 6   • aspirin  MG Tablet Delayed Response Take 1 Tab by mouth every day. (Patient not taking: Reported on 2/10/2021) 100 Tab 3     No current facility-administered medications on file prior to visit.         Allergies:   No Known Allergies    Social Hx:   Social History     Socioeconomic History   • Marital status:      Spouse name: Not on file   • Number of children: Not on file   • Years of education: Not on file   • Highest education level: Not on file   Occupational History   • Not on file   Tobacco Use   • Smoking status: Never Smoker   • Smokeless  "tobacco: Never Used   Substance and Sexual Activity   • Alcohol use: No   • Drug use: No   • Sexual activity: Not on file   Other Topics Concern   •  Service No   • Blood Transfusions Yes   • Caffeine Concern No   • Occupational Exposure No   • Hobby Hazards No   • Sleep Concern No   • Stress Concern No   • Weight Concern No   • Special Diet No   • Back Care No   • Exercise No   • Bike Helmet No   • Seat Belt Yes   • Self-Exams No   Social History Narrative   • Not on file     Social Determinants of Health     Financial Resource Strain:    • Difficulty of Paying Living Expenses:    Food Insecurity:    • Worried About Running Out of Food in the Last Year:    • Ran Out of Food in the Last Year:    Transportation Needs:    • Lack of Transportation (Medical):    • Lack of Transportation (Non-Medical):    Physical Activity:    • Days of Exercise per Week:    • Minutes of Exercise per Session:    Stress:    • Feeling of Stress :    Social Connections:    • Frequency of Communication with Friends and Family:    • Frequency of Social Gatherings with Friends and Family:    • Attends Oriental orthodox Services:    • Active Member of Clubs or Organizations:    • Attends Club or Organization Meetings:    • Marital Status:    Intimate Partner Violence:    • Fear of Current or Ex-Partner:    • Emotionally Abused:    • Physically Abused:    • Sexually Abused:          EXAMINATION     Physical Exam:   Vitals: /70 (BP Location: Left arm, Patient Position: Sitting, BP Cuff Size: Adult)   Pulse (!) 111   Temp 36 °C (96.8 °F) (Temporal)   Ht 1.727 m (5' 8\")   Wt 109 kg (239 lb 6.7 oz)   SpO2 99%     Constitutional:   Body Habitus: Body mass index is 36.4 kg/m².  Cooperation: Fully cooperates with exam  Appearance: Well-groomed no disheveled    Respiratory-  breathing comfortable on room air, no audible wheezing  Cardiovascular- capillary refills less than 2 seconds. No lower extremity edema is noted.   Psychiatric- alert and " oriented ×3. Normal affect.    MSK and Neuro: -    There are no signs of infection around the injection sites.   decreased active range of motion with flexion, lateral flexion, and rotation bilaterally.   There is decreased active range of motion with lumbar extension.    There is  pain with lumbar extension.   There is pain with facet loading maneuver (extension rotation) with axial low back pain on the BILATERAL side(s)    Palpation:   No tenderness to palpation in midline at T1-T12 levels. No tenderness to palpation in the left and right of the midline T1-L5, NEGATIVE for tenderness to palpation to the para-midline region in the lower lumbar levels.  palpation over SI joint: negative bilaterally    palpation in hip or over the gluteus medius tendon insertion: negative bilaterally      Lumbar spine Special tests  Neuro tension  Straight leg test negative bilaterally    Slump test negative bilaterally      Key points for the international standards for neurological classification of spinal cord injury (ISNCSCI) to light touch.     Dermatome R L                                      L2 2 2   L3 2 2   L4 2 2   L5 2 2   S1 2 2   S2 2 2         Motor Exam Lower Extremities    ? Myotome R L   Hip flexion L2 5 5   Knee extension L3 5 5   Ankle dorsiflexion L4 5 5   Toe extension L5 5 5   Ankle plantarflexion S1 5 5                 MEDICAL DECISION MAKING    DATA    Labs:   Lab Results   Component Value Date/Time    SODIUM 137 12/01/2017 03:08 PM    POTASSIUM 3.5 (L) 12/01/2017 03:08 PM    CHLORIDE 103 12/01/2017 03:08 PM    CO2 24 12/01/2017 03:08 PM    GLUCOSE 104 (H) 12/01/2017 03:08 PM    BUN 19 12/01/2017 03:08 PM    CREATININE 0.85 12/01/2017 03:08 PM        Lab Results   Component Value Date/Time    PROTHROMBTM 12.5 12/01/2017 03:08 PM    INR 0.97 12/01/2017 03:08 PM        Lab Results   Component Value Date/Time    WBC 6.9 12/01/2017 03:08 PM    RBC 4.50 12/01/2017 03:08 PM    HEMOGLOBIN 14.3 12/01/2017 03:08 PM     HEMATOCRIT 42.3 2017 03:08 PM    MCV 94.0 2017 03:08 PM    MCH 31.8 2017 03:08 PM    MCHC 33.8 2017 03:08 PM    MPV 10.6 2017 03:08 PM    NEUTSPOLYS 54.90 2017 03:08 PM    LYMPHOCYTES 33.00 2017 03:08 PM    MONOCYTES 8.70 2017 03:08 PM    EOSINOPHILS 2.50 2017 03:08 PM    BASOPHILS 0.60 2017 03:08 PM        Lab Results   Component Value Date/Time    HBA1C 5.6 2017 03:28 PM          Imaging:   I personally reviewed following images    I reviewed the fluoroscopic images from 2021 showing successful diagnostic medial branch blocks targeting the bilateral L4-5 and L5-S1 facet joints.  I reviewed these with the patient at the visit on 2021.    I reviewed the following radiology reports           Results for orders placed during the hospital encounter of 17   MR-BRAIN-W/O    Impression MRI OF THE BRAIN WITHOUT CONTRAST WITHIN NORMAL LIMITS.                                 Results for orders placed in visit on 12/10/20   MR-LUMBAR SPINE-W/O        Results for orders placed during the hospital encounter of 11/07/10   MR-LUMBAR SPINE-WITH & W/O            Results for orders placed in visit on 12/10/20   MR-LUMBAR SPINE-W/O                                                                                                                                                                                                        Results for orders placed in visit on 12/10/20   DX-LUMBAR SPINE-4+ VIEWS                                         DIAGNOSIS   Visit Diagnoses     ICD-10-CM   1. Post laminectomy syndrome  M96.1   2. Chronic bilateral low back pain without sciatica  M54.5    G89.29   3. History of lumbosacral spine surgery done by Dr Yun  Z98.890   4. Lumbar spondylosis  M47.816   5. Rib pain on left side  R07.81   6. Closed fracture of one rib of left side, initial encounter  S22.32XA         ASSESSMENT and PLAN:     Jovita LUNDBERG  1945 female      Jovita was seen today for follow-up.    Diagnoses and all orders for this visit:    Post laminectomy syndrome  -     REFERRAL TO PSYCHOLOGY  -     REFERRAL TO PHYSICAL MEDICINE REHAB  -     HYDROcodone-acetaminophen (NORCO) 5-325 MG Tab per tablet; Take 1 tablet by mouth every 8 hours as needed (moderate to severe pain) for up to 6 days.  -     Consent for Opiate Prescription  -     Controlled Substance Treatment Agreement  -     Naloxone (NARCAN) 4 MG/0.1ML Liquid; Administer 4 mg into affected nostril(S) as needed (For severe sleepiness or difficulty breathing from possible overdose. Call 911 after administration.).    Chronic bilateral low back pain without sciatica  -     REFERRAL TO PSYCHOLOGY  -     REFERRAL TO PHYSICAL MEDICINE REHAB  -     HYDROcodone-acetaminophen (NORCO) 5-325 MG Tab per tablet; Take 1 tablet by mouth every 8 hours as needed (moderate to severe pain) for up to 6 days.  -     Consent for Opiate Prescription  -     Controlled Substance Treatment Agreement  -     Naloxone (NARCAN) 4 MG/0.1ML Liquid; Administer 4 mg into affected nostril(S) as needed (For severe sleepiness or difficulty breathing from possible overdose. Call 911 after administration.).    History of lumbosacral spine surgery done by Dr Yun  -     REFERRAL TO PSYCHOLOGY  -     REFERRAL TO PHYSICAL MEDICINE REHAB  -     HYDROcodone-acetaminophen (NORCO) 5-325 MG Tab per tablet; Take 1 tablet by mouth every 8 hours as needed (moderate to severe pain) for up to 6 days.  -     Consent for Opiate Prescription  -     Controlled Substance Treatment Agreement  -     Naloxone (NARCAN) 4 MG/0.1ML Liquid; Administer 4 mg into affected nostril(S) as needed (For severe sleepiness or difficulty breathing from possible overdose. Call 911 after administration.).    Lumbar spondylosis  -     REFERRAL TO PSYCHOLOGY  -     REFERRAL TO PHYSICAL MEDICINE REHAB  -     HYDROcodone-acetaminophen (NORCO) 5-325 MG Tab per  tablet; Take 1 tablet by mouth every 8 hours as needed (moderate to severe pain) for up to 6 days.  -     Consent for Opiate Prescription  -     Controlled Substance Treatment Agreement  -     Naloxone (NARCAN) 4 MG/0.1ML Liquid; Administer 4 mg into affected nostril(S) as needed (For severe sleepiness or difficulty breathing from possible overdose. Call 911 after administration.).    Rib pain on left side  -     HYDROcodone-acetaminophen (NORCO) 5-325 MG Tab per tablet; Take 1 tablet by mouth every 8 hours as needed (moderate to severe pain) for up to 6 days.  -     Consent for Opiate Prescription  -     Controlled Substance Treatment Agreement  -     Naloxone (NARCAN) 4 MG/0.1ML Liquid; Administer 4 mg into affected nostril(S) as needed (For severe sleepiness or difficulty breathing from possible overdose. Call 911 after administration.).    Closed fracture of one rib of left side, initial encounter  -     HYDROcodone-acetaminophen (NORCO) 5-325 MG Tab per tablet; Take 1 tablet by mouth every 8 hours as needed (moderate to severe pain) for up to 6 days.  -     Consent for Opiate Prescription  -     Controlled Substance Treatment Agreement  -     Naloxone (NARCAN) 4 MG/0.1ML Liquid; Administer 4 mg into affected nostril(S) as needed (For severe sleepiness or difficulty breathing from possible overdose. Call 911 after administration.).         The patient is failed multiple conservative treatments including diagnostic medial branch blocks, previous injections, surgery, physical therapy, medication management for greater than 6 months and has severe pain and functional deficits.  We have discussed multiple other interventions and I believe that spinal cord stimulation is reasonable for her.  I placed a referral for psychology for psychological clearance.  I also provided the patient with literature on spinal cord stimulation and the contact information for our local representative.    I placed an order for diagnostic  spinal cord stimulator trials.  I would like for this to be after the patient's rib fracture has healed.    The Norco above was written for acute pain for the patient's rib fracture.  I also advised the patient to follow-up with her PCP in regards to bone health and possible DEXA scan.    Last opioid risk scale: 2/10/2021   Last urine drug screen: 2/10/2021   Last controlled substance agreement: 2/10/2021    reviewed: 2/10/2021   Naloxone prescribed: yes. Discussed when and how to use the antidote upon prescription.        Opioid Risk Score: 1    Interpretation of Opioid Risk Score   Score 0-3 = Low risk of abuse. Do UDS at least once per year.  Score 4-7 = Moderate risk of abuse. Do UDS 1-4 times per year.  Score 8+ = High risk of abuse. Refer to specialist.     I reviewed the     In prescribing controlled substances to this patient, I certify that I have obtained and reviewed the medical history of Jovita Chaves. I have also made a good dilia effort to obtain applicable records from other providers who have treated the patient and records did not demonstrate any increased risk of substance abuse that would prevent me from prescribing controlled substances.     I have conducted a physical exam and documented it. I have reviewed Ms. Chaves’s prescription history as maintained by the [place] Prescription Monitoring Program.     I have assessed the patient’s risk for abuse, dependency, and addiction using the validated Opioid Risk Tool available at https://www.mdcalc.com/vcklvy-qdag-yuwt-ort-narcotic-abuse.     Given the above, I believe the benefits of controlled substance therapy outweigh the risks. The reasons for prescribing controlled substances include non-narcotic, oral analgesic alternatives have been inadequate for pain control. Accordingly, I have discussed the risk and benefits, treatment plan, and alternative therapies with the patient.               Follow up: After that spinal cord  stimulator trials    Thank you for allowing me to participate in the care of this patient. If you have any questions please not hesitate to contact me.           Please note that this dictation was created using voice recognition software. I have made every reasonable attempt to correct obvious errors but there may be errors of grammar and content that I may have overlooked prior to finalization of this note.      Will Vazquez MD  Interventional Spine and Sports Physiatry  Physical Medicine and Rehabilitation  [facility]

## 2021-02-10 NOTE — TELEPHONE ENCOUNTER
Cancel medial branch block #2. Lets follow up in clinic or virtual visit today or tomorrow to discuss next steps .    Dr Vazquez

## 2021-02-10 NOTE — PATIENT INSTRUCTIONS
Your procedure will be at the [facility] special procedure suite.    [facility] [place] [address]       PRE-PROCEDURE INSTRUCTIONS  You may take your regular medications except:   · No Anti-inflammatories 5 days prior to your procedure. Anti-inflammatories include medicines such as  ibuprofen (Motrin, Advil), Excedrin, Naproxen (Aleve, Anaprox, Naprelan, Naprosyn), Celecoxib (Celebrex), Diclofenac (Voltaren-XR tab), and Meloxicam (Mobic).   · You can take the remainder of your pain medications as prescribed.   · If you are having a diagnostic procedure such as a medial branch block, do not use her pain meds on the day of the procedure  · No Glucophage or Metformin 24 hours before your procedure. You may resume next day after your procedure.  · Call the physiatry office if you are taking or prescribed anti-biotics within five days of procedure.  · Please ask provider if you are taking any new diabetes medication.  · CONTINUE TAKING BLOOD PRESSURE MEDICATIONS AS PRESCRIBED.  · Pain medications will not be prescribed on the procedure day. Procedural pain medication may be used by your provider   · Call your doctor's office performing the procedure if you have a fever, chills, rash or new illness prior to your procedure    Anticoagulation/antiplatelet medications  No Blood thinning medications such as Coumadin or Plavix 5 days prior to procedure unless your doctor said to continue these medications. Call your doctor if a new medication is prescribed in this class.     Restrictions for eating before procedure:   · If you are getting procedural sedation, then do not eat to for 8 hours prior to procedure appointment time. Do not drink fluids for four hours prior to your procedure time.   · If you are not having procedural sedation, then Skip the meal prior to your procedure. If you have a morning procedure then skip breakfast. If you have an afternoon procedure then skip lunch.   · You may drink clear  liquids up to 2 hours prior to your procedure  · You must have a  the day of procedure to accompany you home.      POST PROCEDURE INSTRUCTIONS   · Additional directions will be given on the day of the procedure in regards to care of the trial stimulator  · IF YOU EXPERIENCE PROLONGED WEAKNESS LONGER THAN ONE DAY, BOWEL OR BLADDER INCONTINENCE THEN PLEASE CALL THE PHYSIATRY OFFICE.  · Your leg may feel heavy, weak and numb for up to 1-2 days. Be very careful walking.   ·  You may resume normal activities 3 days after procedure.

## 2021-02-13 ENCOUNTER — APPOINTMENT (OUTPATIENT)
Dept: FAMILY PLANNING/WOMEN'S HEALTH CLINIC | Facility: IMMUNIZATION CENTER | Age: 76
End: 2021-02-13
Attending: INTERNAL MEDICINE
Payer: MEDICARE

## 2021-02-13 ENCOUNTER — IMMUNIZATION (OUTPATIENT)
Dept: FAMILY PLANNING/WOMEN'S HEALTH CLINIC | Facility: IMMUNIZATION CENTER | Age: 76
End: 2021-02-13
Payer: MEDICARE

## 2021-02-13 DIAGNOSIS — Z23 ENCOUNTER FOR VACCINATION: Primary | ICD-10-CM

## 2021-02-13 PROCEDURE — 91300 PFIZER SARS-COV-2 VACCINE: CPT

## 2021-02-13 PROCEDURE — 0002A PFIZER SARS-COV-2 VACCINE: CPT

## 2021-03-15 ENCOUNTER — OFFICE VISIT (OUTPATIENT)
Dept: PHYSICAL MEDICINE AND REHAB | Facility: MEDICAL CENTER | Age: 76
End: 2021-03-15
Payer: MEDICARE

## 2021-03-15 VITALS
SYSTOLIC BLOOD PRESSURE: 126 MMHG | OXYGEN SATURATION: 100 % | DIASTOLIC BLOOD PRESSURE: 80 MMHG | HEART RATE: 98 BPM | WEIGHT: 236.33 LBS | HEIGHT: 68 IN | BODY MASS INDEX: 35.82 KG/M2 | TEMPERATURE: 96.8 F

## 2021-03-15 DIAGNOSIS — Z98.890 HISTORY OF LUMBOSACRAL SPINE SURGERY: ICD-10-CM

## 2021-03-15 DIAGNOSIS — M47.816 LUMBAR SPONDYLOSIS: ICD-10-CM

## 2021-03-15 DIAGNOSIS — R07.81 RIB PAIN ON LEFT SIDE: ICD-10-CM

## 2021-03-15 DIAGNOSIS — S22.32XA CLOSED FRACTURE OF ONE RIB OF LEFT SIDE, INITIAL ENCOUNTER: ICD-10-CM

## 2021-03-15 DIAGNOSIS — M54.50 CHRONIC BILATERAL LOW BACK PAIN WITHOUT SCIATICA: ICD-10-CM

## 2021-03-15 DIAGNOSIS — G89.29 CHRONIC BILATERAL LOW BACK PAIN WITHOUT SCIATICA: ICD-10-CM

## 2021-03-15 DIAGNOSIS — M96.1 POST LAMINECTOMY SYNDROME: ICD-10-CM

## 2021-03-15 PROCEDURE — 99214 OFFICE O/P EST MOD 30 MIN: CPT | Performed by: PHYSICAL MEDICINE & REHABILITATION

## 2021-03-15 RX ORDER — GABAPENTIN 100 MG/1
100-300 CAPSULE ORAL NIGHTLY PRN
Qty: 90 CAPSULE | Refills: 3 | Status: SHIPPED
Start: 2021-03-15 | End: 2021-05-24

## 2021-03-15 RX ORDER — LIDOCAINE 50 MG/G
1 PATCH TOPICAL EVERY 24 HOURS
Qty: 30 PATCH | Refills: 3 | Status: SHIPPED | OUTPATIENT
Start: 2021-03-15 | End: 2021-05-24

## 2021-03-15 ASSESSMENT — PAIN SCALES - GENERAL: PAINLEVEL: 10=SEVERE PAIN

## 2021-03-15 ASSESSMENT — PATIENT HEALTH QUESTIONNAIRE - PHQ9: CLINICAL INTERPRETATION OF PHQ2 SCORE: 0

## 2021-03-15 NOTE — PROGRESS NOTES
Follow up patient note  Interventional spine and sports physiatry, Physical medicine rehabilitation      Chief complaint:   Chief Complaint   Patient presents with   • Follow-Up     Back pain          HISTORY    Please see new patient note by Dr Vazquez,  for more details.     HPI  Patient identification: Jovita Chaves ,  1945,   With Diagnoses of Closed fracture of one rib of left side, initial encounter, Rib pain on left side, Post laminectomy syndrome, History of lumbosacral spine surgery done by Dr Yun, Chronic bilateral low back pain without sciatica, and Lumbar spondylosis were pertinent to this visit.     Procedures:  2021 diagnostic medial branch blocks targeting the bilateral L4-5 and L5-S1 facet joints.  Initially post procedure the patient had 100% pain relief.  By the time the patient went home there was minimal pain relief.  This will be called a negative block  2021 Medial Branch Blocks targeting the left L3-4, L4-5 and L5-S1  facet joints with 100% pain relief minutes post procedure however during the diagnostic phase the pain returned back to pre procedure pain with walking, standing and ADLs. This will be called a negative block.     The patient continues to have pain on the left side rib after a spontaneous rib fracture.  The patient is to follow-up with her PCP regarding bone health.  This pain is 6 out of 10 intensity worse with breathing and only with pain at the spot where the rib fracture is.  This is slowly healing and is improving but still significantly painful for her.  She has been using Norco Sparingly and wishes to get off of her opioid medications if possible.    The majority of her pain is mostly axial bilateral low back pain she rates as a 10 out of 10 in intensity.  Sharp in quality nonradiating worse with standing walking bending.  Denies recent trauma or injuries         ROS Red Flags :   Fever, Chills, Sweats: Denies  Involuntary Weight Loss:  Denies  Bowel/Bladder Incontinence: Denies  Saddle Anesthesia: Denies        PMHx:   Past Medical History:   Diagnosis Date   • Arthritis     lower back   • Cervicalgia    • Hypothyroid    • Migraine without aura, with intractable migraine, so stated, without mention of status migrainosus    • Obesity    • Pain     lower back 8/10   • Thoracic or lumbosacral neuritis or radiculitis, unspecified     Bilateral L4/5   • Unspecified disorder of thyroid        PSHx:   Past Surgical History:   Procedure Laterality Date   • LUMBAR MEDIAL BRANCH BLOCKS Left 2/9/2021    Procedure: BLOCK, NERVE, SPINAL, LUMBAR, POSTERIOR RAMUS, MEDIAL BRANCH;  Surgeon: Will Vazquez M.D.;  [facility];  Service: Pain Management   • LUMBAR MEDIAL BRANCH BLOCKS Bilateral 1/4/2021    Procedure: BLOCK, NERVE, SPINAL, LUMBAR, POSTERIOR RAMUS, MEDIAL BRANCH;  Surgeon: Will Vazquez M.D.;  Location: [facility];  Service: Pain Management   • IRRIGATION & DEBRIDEMENT NEURO  11/8/2010    Performed by JANET FELIX at [facility]   • LUMBAR LAMINECTOMY DISKECTOMY  9/27/2010    Performed by JANET FELIX at [facility]   • LAMINOTOMY  9/27/2010    Performed by JANET FELIX at [facility]   • OTHER  2010    epidural injections   • OTHER  2009    left ankle debridement   • OTHER  2006    right foot gusman osteotomy   • OTHER  2005    tendon transfer right foot   • CHOLECYSTECTOMY  1983   • CHOLECYSTECTOMY  1983   • OTHER  1982    left knee repair   • OTHER  1951    hysterectomy   • OTHER ORTHOPEDIC SURGERY      Left knee, right foot tendon, Left ankle debridement   • ROTATOR CUFF REPAIR  [id number]    rotator cuff, brachioplexus nerve       Family history   Family History   Problem Relation Age of Onset   • Cancer Mother         Colon   • Thyroid Mother         Hypothyroid   • Other Father         Migraine         Medications:   Outpatient Medications Marked as  Taking for the 3/15/21 encounter (Office Visit) with Will Vazquez M.D.   Medication Sig Dispense Refill   • aspirin EC (ECOTRIN) 81 MG Tablet Delayed Response Take 81 mg by mouth every day.     • gabapentin (NEURONTIN) 100 MG Cap Take 1-3 Capsules by mouth at bedtime as needed (pain). 90 capsule 3   • lidocaine (LIDODERM) 5 % Patch Place 1 Patch on the skin every 24 hours. Apply for 12 hours maximum and then take off for 12 hours. 30 Patch 3   • Naloxone (NARCAN) 4 MG/0.1ML Liquid Administer 4 mg into affected nostril(S) as needed (For severe sleepiness or difficulty breathing from possible overdose. Call 911 after administration.). 1 Each 0   • divalproex (DEPAKOTE) 250 MG Tablet Delayed Response      • rosuvastatin (CRESTOR) 10 MG Tab      • Omega-3 Fatty Acids (RA FISH OIL) 1400 MG CAPSULE DELAYED RELEASE Take 2 Caps by mouth every day. 60 Cap 3   • levothyroxine (SYNTHROID) 125 MCG Tab Take 125 mcg by mouth Every morning on an empty stomach.     • Cholecalciferol (VITAMIN D3) 5000 units Cap Take 1 Cap by mouth every day.     • sumatriptan (IMITREX) 100 MG tablet Take 1 Tab by mouth. 1 tab at headache onset; repeat in 1 hour prn. 9 Tab 6        Current Outpatient Medications on File Prior to Visit   Medication Sig Dispense Refill   • aspirin EC (ECOTRIN) 81 MG Tablet Delayed Response Take 81 mg by mouth every day.     • Naloxone (NARCAN) 4 MG/0.1ML Liquid Administer 4 mg into affected nostril(S) as needed (For severe sleepiness or difficulty breathing from possible overdose. Call 911 after administration.). 1 Each 0   • divalproex (DEPAKOTE) 250 MG Tablet Delayed Response      • rosuvastatin (CRESTOR) 10 MG Tab      • Omega-3 Fatty Acids (RA FISH OIL) 1400 MG CAPSULE DELAYED RELEASE Take 2 Caps by mouth every day. 60 Cap 3   • levothyroxine (SYNTHROID) 125 MCG Tab Take 125 mcg by mouth Every morning on an empty stomach.     • Cholecalciferol (VITAMIN D3) 5000 units Cap Take 1 Cap by mouth every day.     •  sumatriptan (IMITREX) 100 MG tablet Take 1 Tab by mouth. 1 tab at headache onset; repeat in 1 hour prn. 9 Tab 6   • aspirin  MG Tablet Delayed Response Take 1 Tab by mouth every day. (Patient not taking: Reported on 2/10/2021) 100 Tab 3     No current facility-administered medications on file prior to visit.         Allergies:   No Known Allergies    Social Hx:   Social History     Socioeconomic History   • Marital status:      Spouse name: Not on file   • Number of children: Not on file   • Years of education: Not on file   • Highest education level: Not on file   Occupational History   • Not on file   Tobacco Use   • Smoking status: Never Smoker   • Smokeless tobacco: Never Used   Substance and Sexual Activity   • Alcohol use: No   • Drug use: No   • Sexual activity: Not on file   Other Topics Concern   •  Service No   • Blood Transfusions Yes   • Caffeine Concern No   • Occupational Exposure No   • Hobby Hazards No   • Sleep Concern No   • Stress Concern No   • Weight Concern No   • Special Diet No   • Back Care No   • Exercise No   • Bike Helmet No   • Seat Belt Yes   • Self-Exams No   Social History Narrative   • Not on file     Social Determinants of Health     Financial Resource Strain:    • Difficulty of Paying Living Expenses:    Food Insecurity:    • Worried About Running Out of Food in the Last Year:    • Ran Out of Food in the Last Year:    Transportation Needs:    • Lack of Transportation (Medical):    • Lack of Transportation (Non-Medical):    Physical Activity:    • Days of Exercise per Week:    • Minutes of Exercise per Session:    Stress:    • Feeling of Stress :    Social Connections:    • Frequency of Communication with Friends and Family:    • Frequency of Social Gatherings with Friends and Family:    • Attends Faith Services:    • Active Member of Clubs or Organizations:    • Attends Club or Organization Meetings:    • Marital Status:    Intimate Partner Violence:    •  "Fear of Current or Ex-Partner:    • Emotionally Abused:    • Physically Abused:    • Sexually Abused:          EXAMINATION     Physical Exam:   Vitals: /80 (BP Location: Right arm, Patient Position: Sitting, BP Cuff Size: Adult)   Pulse 98   Temp 36 °C (96.8 °F) (Temporal)   Ht 1.727 m (5' 8\")   Wt 107 kg (236 lb 5.3 oz)   SpO2 100%     Constitutional:   Body Habitus: Body mass index is 35.93 kg/m².  Cooperation: Fully cooperates with exam  Appearance: Well-groomed no disheveled    Respiratory-  breathing comfortable on room air, no audible wheezing  Cardiovascular- capillary refills less than 2 seconds. No lower extremity edema is noted.   Psychiatric- alert and oriented ×3. Normal affect.    MSK and Neuro: -  TTP over the left fifth rib      MEDICAL DECISION MAKING    DATA    Labs:   Lab Results   Component Value Date/Time    SODIUM 137 12/01/2017 03:08 PM    POTASSIUM 3.5 (L) 12/01/2017 03:08 PM    CHLORIDE 103 12/01/2017 03:08 PM    CO2 24 12/01/2017 03:08 PM    GLUCOSE 104 (H) 12/01/2017 03:08 PM    BUN 19 12/01/2017 03:08 PM    CREATININE 0.85 12/01/2017 03:08 PM        Lab Results   Component Value Date/Time    PROTHROMBTM 12.5 12/01/2017 03:08 PM    INR 0.97 12/01/2017 03:08 PM        Lab Results   Component Value Date/Time    WBC 6.9 12/01/2017 03:08 PM    RBC 4.50 12/01/2017 03:08 PM    HEMOGLOBIN 14.3 12/01/2017 03:08 PM    HEMATOCRIT 42.3 12/01/2017 03:08 PM    MCV 94.0 12/01/2017 03:08 PM    MCH 31.8 12/01/2017 03:08 PM    MCHC 33.8 12/01/2017 03:08 PM    MPV 10.6 12/01/2017 03:08 PM    NEUTSPOLYS 54.90 12/01/2017 03:08 PM    LYMPHOCYTES 33.00 12/01/2017 03:08 PM    MONOCYTES 8.70 12/01/2017 03:08 PM    EOSINOPHILS 2.50 12/01/2017 03:08 PM    BASOPHILS 0.60 12/01/2017 03:08 PM        Lab Results   Component Value Date/Time    HBA1C 5.6 12/01/2017 03:28 PM          Imaging:   I personally reviewed following images    I reviewed the fluoroscopic images from 1/4/2021 showing successful " diagnostic medial branch blocks targeting the bilateral L4-5 and L5-S1 facet joints.  I reviewed these with the patient at the visit on 2021.    I reviewed the following radiology reports           Results for orders placed during the hospital encounter of 17   MR-BRAIN-W/O    Impression MRI OF THE BRAIN WITHOUT CONTRAST WITHIN NORMAL LIMITS.                                 Results for orders placed in visit on 12/10/20   MR-LUMBAR SPINE-W/O        Results for orders placed during the hospital encounter of 11/07/10   MR-LUMBAR SPINE-WITH & W/O            Results for orders placed in visit on 12/10/20   MR-LUMBAR SPINE-W/O                                                                                                                                                                                                        Results for orders placed in visit on 12/10/20   DX-LUMBAR SPINE-4+ VIEWS                                         DIAGNOSIS   Visit Diagnoses     ICD-10-CM   1. Closed fracture of one rib of left side, initial encounter  S22.32XA   2. Rib pain on left side  R07.81   3. Post laminectomy syndrome  M96.1   4. History of lumbosacral spine surgery done by Dr Yun  Z98.890   5. Chronic bilateral low back pain without sciatica  M54.5    G89.29   6. Lumbar spondylosis  M47.816         ASSESSMENT and PLAN:     Jovita Chaves  1945 female      Jovita was seen today for follow-up.    Diagnoses and all orders for this visit:    Closed fracture of one rib of left side, initial encounter  -     gabapentin (NEURONTIN) 100 MG Cap; Take 1-3 Capsules by mouth at bedtime as needed (pain).  -     lidocaine (LIDODERM) 5 % Patch; Place 1 Patch on the skin every 24 hours. Apply for 12 hours maximum and then take off for 12 hours.  -     [id number] (W/O CXR) LEFT; Future    Rib pain on left side  -     gabapentin (NEURONTIN) 100 MG Cap; Take 1-3 Capsules by mouth at bedtime as needed  (pain).  -     lidocaine (LIDODERM) 5 % Patch; Place 1 Patch on the skin every 24 hours. Apply for 12 hours maximum and then take off for 12 hours.  -     [id number] (W/O CXR) LEFT; Future    Post laminectomy syndrome  -     gabapentin (NEURONTIN) 100 MG Cap; Take 1-3 Capsules by mouth at bedtime as needed (pain).    History of lumbosacral spine surgery done by Dr Yun  -     gabapentin (NEURONTIN) 100 MG Cap; Take 1-3 Capsules by mouth at bedtime as needed (pain).    Chronic bilateral low back pain without sciatica  -     gabapentin (NEURONTIN) 100 MG Cap; Take 1-3 Capsules by mouth at bedtime as needed (pain).    Lumbar spondylosis  -     gabapentin (NEURONTIN) 100 MG Cap; Take 1-3 Capsules by mouth at bedtime as needed (pain).           The patient is failed multiple conservative treatments including diagnostic medial branch blocks, previous injections, surgery, physical therapy, medication management for greater than 6 months and has severe pain and functional deficits.      I reviewed the notes from psychology including from 2/15/1 from Dr. Wero Cartagena clearing the patient for any surgical intervention and given a greenlight.    We plan to proceed with spinal cord stimulation after the patient is completely healed from her rib fracture.  Discontinue Norco.  Initiate the above medical management       Follow up: After that spinal cord stimulator trials    Thank you for allowing me to participate in the care of this patient. If you have any questions please not hesitate to contact me.           Please note that this dictation was created using voice recognition software. I have made every reasonable attempt to correct obvious errors but there may be errors of grammar and content that I may have overlooked prior to finalization of this note.      Will Vazquez MD  Interventional Spine and Sports Physiatry  Physical Medicine and Rehabilitation  [facility]

## 2021-04-22 ENCOUNTER — HOSPITAL ENCOUNTER (OUTPATIENT)
Dept: RADIOLOGY | Facility: MEDICAL CENTER | Age: 76
End: 2021-04-22
Attending: PHYSICAL MEDICINE & REHABILITATION
Payer: MEDICARE

## 2021-04-22 DIAGNOSIS — R07.81 RIB PAIN ON LEFT SIDE: ICD-10-CM

## 2021-04-22 DIAGNOSIS — S22.32XA CLOSED FRACTURE OF ONE RIB OF LEFT SIDE, INITIAL ENCOUNTER: ICD-10-CM

## 2021-04-22 PROCEDURE — 71100 X-RAY EXAM RIBS UNI 2 VIEWS: CPT | Mod: LT

## 2021-04-29 ENCOUNTER — OFFICE VISIT (OUTPATIENT)
Dept: PHYSICAL MEDICINE AND REHAB | Facility: MEDICAL CENTER | Age: 76
End: 2021-04-29
Payer: MEDICARE

## 2021-04-29 VITALS
SYSTOLIC BLOOD PRESSURE: 122 MMHG | HEIGHT: 68 IN | WEIGHT: 235.45 LBS | HEART RATE: 100 BPM | BODY MASS INDEX: 35.68 KG/M2 | OXYGEN SATURATION: 99 % | DIASTOLIC BLOOD PRESSURE: 82 MMHG | TEMPERATURE: 97 F

## 2021-04-29 DIAGNOSIS — M47.816 LUMBAR SPONDYLOSIS: ICD-10-CM

## 2021-04-29 DIAGNOSIS — M54.50 CHRONIC BILATERAL LOW BACK PAIN WITHOUT SCIATICA: ICD-10-CM

## 2021-04-29 DIAGNOSIS — M96.1 POST LAMINECTOMY SYNDROME: ICD-10-CM

## 2021-04-29 DIAGNOSIS — Z98.890 HISTORY OF LUMBOSACRAL SPINE SURGERY: ICD-10-CM

## 2021-04-29 DIAGNOSIS — G89.29 CHRONIC BILATERAL LOW BACK PAIN WITHOUT SCIATICA: ICD-10-CM

## 2021-04-29 DIAGNOSIS — S22.32XS CLOSED FRACTURE OF ONE RIB OF LEFT SIDE, SEQUELA: ICD-10-CM

## 2021-04-29 PROCEDURE — 99215 OFFICE O/P EST HI 40 MIN: CPT | Performed by: PHYSICAL MEDICINE & REHABILITATION

## 2021-04-29 RX ORDER — CEPHALEXIN 500 MG/1
500 CAPSULE ORAL 4 TIMES DAILY
Qty: 32 CAPSULE | Refills: 0 | Status: SHIPPED | OUTPATIENT
Start: 2021-04-29 | End: 2021-05-07

## 2021-04-29 ASSESSMENT — PATIENT HEALTH QUESTIONNAIRE - PHQ9: CLINICAL INTERPRETATION OF PHQ2 SCORE: 0

## 2021-04-29 ASSESSMENT — PAIN SCALES - GENERAL: PAINLEVEL: 9=SEVERE PAIN

## 2021-04-29 NOTE — PROGRESS NOTES
Follow up patient note  Interventional spine and sports physiatry, Physical medicine rehabilitation      Chief complaint:   Chief Complaint   Patient presents with   • Follow-Up     Review Imaging           HISTORY    Please see new patient note by Dr Vazquez,  for more details.     HPI  Patient identification: Jovita Chaves ,  1945,   With Diagnoses of Post laminectomy syndrome, History of lumbosacral spine surgery done by Dr Yun, Chronic bilateral low back pain without sciatica, Lumbar spondylosis, and Closed fracture of one rib of left side, sequela, healed were pertinent to this visit.     Procedures:  2021 diagnostic medial branch blocks targeting the bilateral L4-5 and L5-S1 facet joints.  Initially post procedure the patient had 100% pain relief.  By the time the patient went home there was minimal pain relief.  This will be called a negative block  2021 Medial Branch Blocks targeting the left L3-4, L4-5 and L5-S1  facet joints with 100% pain relief minutes post procedure however during the diagnostic phase the pain returned back to pre procedure pain with walking, standing and ADLs. This will be called a negative block.     The patient's pain from her rib fracture has resolved and this has healed.  She no longer has pain in the rib and has no pain with breathing.    She continues to have bilateral axial low back pain rarely radiating worse with bending walking lifting with functional deficits.  Difficulty with ADLs including doing the dishes cooking and cleaning.  10 of 10 intensity constant pain.  Failed injections medication management home exercise program physical therapy in the past.  The patient has a history of laminectomy.       ROS Red Flags :   Fever, Chills, Sweats: Denies  Involuntary Weight Loss: Denies  Bowel/Bladder Incontinence: Denies  Saddle Anesthesia: Denies        PMHx:   Past Medical History:   Diagnosis Date   • Arthritis     lower back   • Cervicalgia    •  Hypothyroid    • Migraine without aura, with intractable migraine, so stated, without mention of status migrainosus    • Obesity    • Pain     lower back 8/10   • Thoracic or lumbosacral neuritis or radiculitis, unspecified     Bilateral L4/5   • Unspecified disorder of thyroid        PSHx:   Past Surgical History:   Procedure Laterality Date   • LUMBAR MEDIAL BRANCH BLOCKS Left 2/9/2021    Procedure: BLOCK, NERVE, SPINAL, LUMBAR, POSTERIOR RAMUS, MEDIAL BRANCH;  Surgeon: Will Vazquez M.D.;  [facility];  Service: Pain Management   • LUMBAR MEDIAL BRANCH BLOCKS Bilateral 1/4/2021    Procedure: BLOCK, NERVE, SPINAL, LUMBAR, POSTERIOR RAMUS, MEDIAL BRANCH;  Surgeon: Will Vazquez M.D.;  Location[facility];  Service: Pain Management   • IRRIGATION & DEBRIDEMENT NEURO  11/8/2010    Performed by JANET FELIX at [facility]   • LUMBAR LAMINECTOMY DISKECTOMY  9/27/2010    Performed by JANET FELIX at [facility]ERY [facility]   • LAMINOTOMY  9/27/2010    Performed by JANET FELIX at [facility]   • OTHER  2010    epidural injections   • OTHER  2009    left ankle debridement   • OTHER  2006    right foot gusman osteotomy   • OTHER  2005    tendon transfer right foot   • CHOLECYSTECTOMY  1983   • CHOLECYSTECTOMY  1983   • OTHER  1982    left knee repair   • OTHER  1951    hysterectomy   • OTHER ORTHOPEDIC SURGERY      Left knee, right foot tendon, Left ankle debridement   • ROTATOR CUFF REPAIR  5064-3277    rotator cuff, brachioplexus nerve       Family history   Family History   Problem Relation Age of Onset   • Cancer Mother         Colon   • Thyroid Mother         Hypothyroid   • Other Father         Migraine         Medications:   Outpatient Medications Marked as Taking for the 4/29/21 encounter (Office Visit) with Will Vazquez M.D.   Medication Sig Dispense Refill   • cephALEXin (KEFLEX) 500 MG Cap Take 1 capsule by mouth 4 times a  day for 8 days. 32 capsule 0   • aspirin EC (ECOTRIN) 81 MG Tablet Delayed Response Take 81 mg by mouth every day.     • divalproex (DEPAKOTE) 250 MG Tablet Delayed Response      • rosuvastatin (CRESTOR) 10 MG Tab      • Omega-3 Fatty Acids (RA FISH OIL) 1400 MG CAPSULE DELAYED RELEASE Take 2 Caps by mouth every day. 60 Cap 3   • levothyroxine (SYNTHROID) 125 MCG Tab Take 125 mcg by mouth Every morning on an empty stomach.     • Cholecalciferol (VITAMIN D3) 5000 units Cap Take 1 Cap by mouth every day.     • sumatriptan (IMITREX) 100 MG tablet Take 1 Tab by mouth. 1 tab at headache onset; repeat in 1 hour prn. 9 Tab 6        Current Outpatient Medications on File Prior to Visit   Medication Sig Dispense Refill   • aspirin EC (ECOTRIN) 81 MG Tablet Delayed Response Take 81 mg by mouth every day.     • divalproex (DEPAKOTE) 250 MG Tablet Delayed Response      • rosuvastatin (CRESTOR) 10 MG Tab      • Omega-3 Fatty Acids (RA FISH OIL) 1400 MG CAPSULE DELAYED RELEASE Take 2 Caps by mouth every day. 60 Cap 3   • levothyroxine (SYNTHROID) 125 MCG Tab Take 125 mcg by mouth Every morning on an empty stomach.     • Cholecalciferol (VITAMIN D3) 5000 units Cap Take 1 Cap by mouth every day.     • sumatriptan (IMITREX) 100 MG tablet Take 1 Tab by mouth. 1 tab at headache onset; repeat in 1 hour prn. 9 Tab 6   • gabapentin (NEURONTIN) 100 MG Cap Take 1-3 Capsules by mouth at bedtime as needed (pain). 90 capsule 3   • lidocaine (LIDODERM) 5 % Patch Place 1 Patch on the skin every 24 hours. Apply for 12 hours maximum and then take off for 12 hours. 30 Patch 3   • Naloxone (NARCAN) 4 MG/0.1ML Liquid Administer 4 mg into affected nostril(S) as needed (For severe sleepiness or difficulty breathing from possible overdose. Call 911 after administration.). 1 Each 0   • aspirin  MG Tablet Delayed Response Take 1 Tab by mouth every day. (Patient not taking: Reported on 2/10/2021) 100 Tab 3     No current facility-administered  medications on file prior to visit.         Allergies:   No Known Allergies    Social Hx:   Social History     Socioeconomic History   • Marital status:      Spouse name: Not on file   • Number of children: Not on file   • Years of education: Not on file   • Highest education level: Not on file   Occupational History   • Not on file   Tobacco Use   • Smoking status: Never Smoker   • Smokeless tobacco: Never Used   Substance and Sexual Activity   • Alcohol use: No   • Drug use: No   • Sexual activity: Not on file   Other Topics Concern   •  Service No   • Blood Transfusions Yes   • Caffeine Concern No   • Occupational Exposure No   • Hobby Hazards No   • Sleep Concern No   • Stress Concern No   • Weight Concern No   • Special Diet No   • Back Care No   • Exercise No   • Bike Helmet No   • Seat Belt Yes   • Self-Exams No   Social History Narrative   • Not on file     Social Determinants of Health     Financial Resource Strain:    • Difficulty of Paying Living Expenses:    Food Insecurity:    • Worried About Running Out of Food in the Last Year:    • Ran Out of Food in the Last Year:    Transportation Needs:    • Lack of Transportation (Medical):    • Lack of Transportation (Non-Medical):    Physical Activity:    • Days of Exercise per Week:    • Minutes of Exercise per Session:    Stress:    • Feeling of Stress :    Social Connections:    • Frequency of Communication with Friends and Family:    • Frequency of Social Gatherings with Friends and Family:    • Attends [employer] Services:    • Active Member of Clubs or Organizations:    • Attends Club or Organization Meetings:    • Marital Status:    Intimate Partner Violence:    • Fear of Current or Ex-Partner:    • Emotionally Abused:    • Physically Abused:    • Sexually Abused:          EXAMINATION     Physical Exam:   Vitals: /82 (BP Location: Right arm, Patient Position: Sitting, BP Cuff Size: Adult long)   Pulse 100   Temp 36.1 °C (97 °F)  "(Temporal)   Ht 1.727 m (5' 8\")   Wt 107 kg (235 lb 7.2 oz)   SpO2 99%     Constitutional:   Body Habitus: Body mass index is 35.8 kg/m².  Cooperation: Fully cooperates with exam  Appearance: Well-groomed no disheveled    Respiratory-  breathing comfortable on room air, no audible wheezing  Cardiovascular- capillary refills less than 2 seconds. No lower extremity edema is noted.   Psychiatric- alert and oriented ×3. Normal affect.    MSK and Neuro: -    There was no tenderness palpation along the ribs or thoracic spine in any region.    decreased active range of motion with flexion, lateral flexion, and rotation bilaterally.   There is decreased active range of motion with lumbar extension.      Key points for the international standards for neurological classification of spinal cord injury (ISNCSCI) to light touch.     Dermatome R L                                      L2 2 2   L3 2 2   L4 2 2   L5 2 2   S1 2 2   S2 2 2         Motor Exam Lower Extremities    ? Myotome R L   Hip flexion L2 5 5   Knee extension L3 5 5   Ankle dorsiflexion L4 5 5   Toe extension L5 5 5   Ankle plantarflexion S1 5 5             MEDICAL DECISION MAKING    DATA    Labs:   Lab Results   Component Value Date/Time    SODIUM 137 12/01/2017 03:08 PM    POTASSIUM 3.5 (L) 12/01/2017 03:08 PM    CHLORIDE 103 12/01/2017 03:08 PM    CO2 24 12/01/2017 03:08 PM    GLUCOSE 104 (H) 12/01/2017 03:08 PM    BUN 19 12/01/2017 03:08 PM    CREATININE 0.85 12/01/2017 03:08 PM        Lab Results   Component Value Date/Time    PROTHROMBTM 12.5 12/01/2017 03:08 PM    INR 0.97 12/01/2017 03:08 PM        Lab Results   Component Value Date/Time    WBC 6.9 12/01/2017 03:08 PM    RBC 4.50 12/01/2017 03:08 PM    HEMOGLOBIN 14.3 12/01/2017 03:08 PM    HEMATOCRIT 42.3 12/01/2017 03:08 PM    MCV 94.0 12/01/2017 03:08 PM    MCH 31.8 12/01/2017 03:08 PM    MCHC 33.8 12/01/2017 03:08 PM    MPV 10.6 12/01/2017 03:08 PM    NEUTSPOLYS 54.90 12/01/2017 03:08 PM    " LYMPHOCYTES 33.00 2017 03:08 PM    MONOCYTES 8.70 2017 03:08 PM    EOSINOPHILS 2.50 2017 03:08 PM    BASOPHILS 0.60 2017 03:08 PM        Lab Results   Component Value Date/Time    HBA1C 5.6 2017 03:28 PM          Imaging:   I personally reviewed following images    I reviewed the fluoroscopic images from 2021 showing successful diagnostic medial branch blocks targeting the bilateral L4-5 and L5-S1 facet joints.  I reviewed these with the patient at the visit on 2021.    I reviewed the following radiology reports           Results for orders placed during the hospital encounter of 17   MR-BRAIN-W/O    Impression MRI OF THE BRAIN WITHOUT CONTRAST WITHIN NORMAL LIMITS.                                 Results for orders placed in visit on 12/10/20   MR-LUMBAR SPINE-W/O        Results for orders placed during the hospital encounter of 11/07/10   MR-LUMBAR SPINE-WITH & W/O            Results for orders placed in visit on 12/10/20   MR-LUMBAR SPINE-W/O                                                                                                                                                                                                        Results for orders placed in visit on 12/10/20   DX-LUMBAR SPINE-4+ VIEWS                                         DIAGNOSIS   Visit Diagnoses     ICD-10-CM   1. Post laminectomy syndrome  M96.1   2. History of lumbosacral spine surgery done by Dr Yun  Z98.890   3. Chronic bilateral low back pain without sciatica  M54.5    G89.29   4. Lumbar spondylosis  M47.816   5. Closed fracture of one rib of left side, sequela, healed  S22.32XS         ASSESSMENT and PLAN:     Jovita Chaves  1945 female      Jovita was seen today for follow-up.    Diagnoses and all orders for this visit:    Post laminectomy syndrome  -     cephALEXin (KEFLEX) 500 MG Cap; Take 1 capsule by mouth 4 times a day for 8 days.  -     MR-THORACIC  SPINE-W/O; Future    History of lumbosacral spine surgery done by Dr Yun  -     cephALEXin (KEFLEX) 500 MG Cap; Take 1 capsule by mouth 4 times a day for 8 days.  -     MR-THORACIC SPINE-W/O; Future    Chronic bilateral low back pain without sciatica  -     cephALEXin (KEFLEX) 500 MG Cap; Take 1 capsule by mouth 4 times a day for 8 days.  -     MR-THORACIC SPINE-W/O; Future    Lumbar spondylosis  -     cephALEXin (KEFLEX) 500 MG Cap; Take 1 capsule by mouth 4 times a day for 8 days.  -     MR-THORACIC SPINE-W/O; Future    Closed fracture of one rib of left side, sequela, healed         The patient is failed multiple conservative treatments including diagnostic medial branch blocks, previous injections, surgery, physical therapy, medication management for greater than 6 months and has severe pain and functional deficits.      I reviewed the notes from psychology including from 2/15/1 from Dr. Wero Cartagena clearing the patient for any surgical intervention and given a greenlight.    The rib fracture has healed.    Proceed with spinal cord stimulation trials as scheduled.    Antibiotics for prophylactic reasons during the trial    MRI thoracic spine ordered to evaluate for safe passage of the spinal cord stimulation leads into the thoracic epidural space.    We discussed the nature of spinal cord stimulators as well as the trial.    Total time spent on the day of the encounter: 43 minutes.  This includes counseling, care coordination, medical records review.        Follow up: Approximately 1 week after the trial    Thank you for allowing me to participate in the care of this patient. If you have any questions please not hesitate to contact me.           Please note that this dictation was created using voice recognition software. I have made every reasonable attempt to correct obvious errors but there may be errors of grammar and content that I may have overlooked prior to finalization of this note.      Will  MD Taylor  Interventional Spine and Sports Physiatry  Physical Medicine and Rehabilitation  [facility]

## 2021-04-29 NOTE — H&P (VIEW-ONLY)
Follow up patient note  Interventional spine and sports physiatry, Physical medicine rehabilitation      Chief complaint:   Chief Complaint   Patient presents with   • Follow-Up     Review Imaging           HISTORY    Please see new patient note by Dr Vazquez,  for more details.     HPI  Patient identification: Jovita Chaves ,  1945,   With Diagnoses of Post laminectomy syndrome, History of lumbosacral spine surgery done by Dr Yun, Chronic bilateral low back pain without sciatica, Lumbar spondylosis, and Closed fracture of one rib of left side, sequela, healed were pertinent to this visit.     Procedures:  2021 diagnostic medial branch blocks targeting the bilateral L4-5 and L5-S1 facet joints.  Initially post procedure the patient had 100% pain relief.  By the time the patient went home there was minimal pain relief.  This will be called a negative block  2021 Medial Branch Blocks targeting the left L3-4, L4-5 and L5-S1  facet joints with 100% pain relief minutes post procedure however during the diagnostic phase the pain returned back to pre procedure pain with walking, standing and ADLs. This will be called a negative block.     The patient's pain from her rib fracture has resolved and this has healed.  She no longer has pain in the rib and has no pain with breathing.    She continues to have bilateral axial low back pain rarely radiating worse with bending walking lifting with functional deficits.  Difficulty with ADLs including doing the dishes cooking and cleaning.  10 of 10 intensity constant pain.  Failed injections medication management home exercise program physical therapy in the past.  The patient has a history of laminectomy.       ROS Red Flags :   Fever, Chills, Sweats: Denies  Involuntary Weight Loss: Denies  Bowel/Bladder Incontinence: Denies  Saddle Anesthesia: Denies        PMHx:   Past Medical History:   Diagnosis Date   • Arthritis     lower back   • Cervicalgia    •  Hypothyroid    • Migraine without aura, with intractable migraine, so stated, without mention of status migrainosus    • Obesity    • Pain     lower back 8/10   • Thoracic or lumbosacral neuritis or radiculitis, unspecified     Bilateral L4/5   • Unspecified disorder of thyroid        PSHx:   Past Surgical History:   Procedure Laterality Date   • LUMBAR MEDIAL BRANCH BLOCKS Left 2/9/2021    Procedure: BLOCK, NERVE, SPINAL, LUMBAR, POSTERIOR RAMUS, MEDIAL BRANCH;  Surgeon: Will Vazquez M.D.;  [facility];  Service: Pain Management   • LUMBAR MEDIAL BRANCH BLOCKS Bilateral 1/4/2021    Procedure: BLOCK, NERVE, SPINAL, LUMBAR, POSTERIOR RAMUS, MEDIAL BRANCH;  Surgeon: Will Vazquez M.D.;  Location[facility];  Service: Pain Management   • IRRIGATION & DEBRIDEMENT NEURO  11/8/2010    Performed by JANET FELIX at SURGERY [facility]   • LUMBAR LAMINECTOMY DISKECTOMY  9/27/2010    Performed by JANET FELIX at SURGERY [facility]   • LAMINOTOMY  9/27/2010    Performed by JANET FELIX at SURGERY [facility]   • OTHER  2010    epidural injections   • OTHER  2009    left ankle debridement   • OTHER  2006    right foot gusman osteotomy   • OTHER  2005    tendon transfer right foot   • CHOLECYSTECTOMY  1983   • CHOLECYSTECTOMY  1983   • OTHER  1982    left knee repair   • OTHER  1951    hysterectomy   • OTHER ORTHOPEDIC SURGERY      Left knee, right foot tendon, Left ankle debridement   • ROTATOR CUFF REPAIR  [id number]    rotator cuff, brachioplexus nerve       Family history   Family History   Problem Relation Age of Onset   • Cancer Mother         Colon   • Thyroid Mother         Hypothyroid   • Other Father         Migraine         Medications:   Outpatient Medications Marked as Taking for the 4/29/21 encounter (Office Visit) with Will Vazquez M.D.   Medication Sig Dispense Refill   • cephALEXin (KEFLEX) 500 MG Cap Take 1 capsule by mouth 4 times a  day for 8 days. 32 capsule 0   • aspirin EC (ECOTRIN) 81 MG Tablet Delayed Response Take 81 mg by mouth every day.     • divalproex (DEPAKOTE) 250 MG Tablet Delayed Response      • rosuvastatin (CRESTOR) 10 MG Tab      • Omega-3 Fatty Acids (RA FISH OIL) 1400 MG CAPSULE DELAYED RELEASE Take 2 Caps by mouth every day. 60 Cap 3   • levothyroxine (SYNTHROID) 125 MCG Tab Take 125 mcg by mouth Every morning on an empty stomach.     • Cholecalciferol (VITAMIN D3) 5000 units Cap Take 1 Cap by mouth every day.     • sumatriptan (IMITREX) 100 MG tablet Take 1 Tab by mouth. 1 tab at headache onset; repeat in 1 hour prn. 9 Tab 6        Current Outpatient Medications on File Prior to Visit   Medication Sig Dispense Refill   • aspirin EC (ECOTRIN) 81 MG Tablet Delayed Response Take 81 mg by mouth every day.     • divalproex (DEPAKOTE) 250 MG Tablet Delayed Response      • rosuvastatin (CRESTOR) 10 MG Tab      • Omega-3 Fatty Acids (RA FISH OIL) 1400 MG CAPSULE DELAYED RELEASE Take 2 Caps by mouth every day. 60 Cap 3   • levothyroxine (SYNTHROID) 125 MCG Tab Take 125 mcg by mouth Every morning on an empty stomach.     • Cholecalciferol (VITAMIN D3) 5000 units Cap Take 1 Cap by mouth every day.     • sumatriptan (IMITREX) 100 MG tablet Take 1 Tab by mouth. 1 tab at headache onset; repeat in 1 hour prn. 9 Tab 6   • gabapentin (NEURONTIN) 100 MG Cap Take 1-3 Capsules by mouth at bedtime as needed (pain). 90 capsule 3   • lidocaine (LIDODERM) 5 % Patch Place 1 Patch on the skin every 24 hours. Apply for 12 hours maximum and then take off for 12 hours. 30 Patch 3   • Naloxone (NARCAN) 4 MG/0.1ML Liquid Administer 4 mg into affected nostril(S) as needed (For severe sleepiness or difficulty breathing from possible overdose. Call 911 after administration.). 1 Each 0   • aspirin  MG Tablet Delayed Response Take 1 Tab by mouth every day. (Patient not taking: Reported on 2/10/2021) 100 Tab 3     No current facility-administered  medications on file prior to visit.         Allergies:   No Known Allergies    Social Hx:   Social History     Socioeconomic History   • Marital status:      Spouse name: Not on file   • Number of children: Not on file   • Years of education: Not on file   • Highest education level: Not on file   Occupational History   • Not on file   Tobacco Use   • Smoking status: Never Smoker   • Smokeless tobacco: Never Used   Substance and Sexual Activity   • Alcohol use: No   • Drug use: No   • Sexual activity: Not on file   Other Topics Concern   •  Service No   • Blood Transfusions Yes   • Caffeine Concern No   • Occupational Exposure No   • Hobby Hazards No   • Sleep Concern No   • Stress Concern No   • Weight Concern No   • Special Diet No   • Back Care No   • Exercise No   • Bike Helmet No   • Seat Belt Yes   • Self-Exams No   Social History Narrative   • Not on file     Social Determinants of Health     Financial Resource Strain:    • Difficulty of Paying Living Expenses:    Food Insecurity:    • Worried About Running Out of Food in the Last Year:    • Ran Out of Food in the Last Year:    Transportation Needs:    • Lack of Transportation (Medical):    • Lack of Transportation (Non-Medical):    Physical Activity:    • Days of Exercise per Week:    • Minutes of Exercise per Session:    Stress:    • Feeling of Stress :    Social Connections:    • Frequency of Communication with Friends and Family:    • Frequency of Social Gatherings with Friends and Family:    • Attends Ten[employer] Services:    • Active Member of Clubs or Organizations:    • Attends Club or Organization Meetings:    • Marital Status:    Intimate Partner Violence:    • Fear of Current or Ex-Partner:    • Emotionally Abused:    • Physically Abused:    • Sexually Abused:          EXAMINATION     Physical Exam:   Vitals: /82 (BP Location: Right arm, Patient Position: Sitting, BP Cuff Size: Adult long)   Pulse 100   Temp 36.1 °C (97 °F)  "(Temporal)   Ht 1.727 m (5' 8\")   Wt 107 kg (235 lb 7.2 oz)   SpO2 99%     Constitutional:   Body Habitus: Body mass index is 35.8 kg/m².  Cooperation: Fully cooperates with exam  Appearance: Well-groomed no disheveled    Respiratory-  breathing comfortable on room air, no audible wheezing  Cardiovascular- capillary refills less than 2 seconds. No lower extremity edema is noted.   Psychiatric- alert and oriented ×3. Normal affect.    MSK and Neuro: -    There was no tenderness palpation along the ribs or thoracic spine in any region.    decreased active range of motion with flexion, lateral flexion, and rotation bilaterally.   There is decreased active range of motion with lumbar extension.      Key points for the international standards for neurological classification of spinal cord injury (ISNCSCI) to light touch.     Dermatome R L                                      L2 2 2   L3 2 2   L4 2 2   L5 2 2   S1 2 2   S2 2 2         Motor Exam Lower Extremities    ? Myotome R L   Hip flexion L2 5 5   Knee extension L3 5 5   Ankle dorsiflexion L4 5 5   Toe extension L5 5 5   Ankle plantarflexion S1 5 5             MEDICAL DECISION MAKING    DATA    Labs:   Lab Results   Component Value Date/Time    SODIUM 137 12/01/2017 03:08 PM    POTASSIUM 3.5 (L) 12/01/2017 03:08 PM    CHLORIDE 103 12/01/2017 03:08 PM    CO2 24 12/01/2017 03:08 PM    GLUCOSE 104 (H) 12/01/2017 03:08 PM    BUN 19 12/01/2017 03:08 PM    CREATININE 0.85 12/01/2017 03:08 PM        Lab Results   Component Value Date/Time    PROTHROMBTM 12.5 12/01/2017 03:08 PM    INR 0.97 12/01/2017 03:08 PM        Lab Results   Component Value Date/Time    WBC 6.9 12/01/2017 03:08 PM    RBC 4.50 12/01/2017 03:08 PM    HEMOGLOBIN 14.3 12/01/2017 03:08 PM    HEMATOCRIT 42.3 12/01/2017 03:08 PM    MCV 94.0 12/01/2017 03:08 PM    MCH 31.8 12/01/2017 03:08 PM    MCHC 33.8 12/01/2017 03:08 PM    MPV 10.6 12/01/2017 03:08 PM    NEUTSPOLYS 54.90 12/01/2017 03:08 PM    " LYMPHOCYTES 33.00 2017 03:08 PM    MONOCYTES 8.70 2017 03:08 PM    EOSINOPHILS 2.50 2017 03:08 PM    BASOPHILS 0.60 2017 03:08 PM        Lab Results   Component Value Date/Time    HBA1C 5.6 2017 03:28 PM          Imaging:   I personally reviewed following images    I reviewed the fluoroscopic images from 2021 showing successful diagnostic medial branch blocks targeting the bilateral L4-5 and L5-S1 facet joints.  I reviewed these with the patient at the visit on 2021.    I reviewed the following radiology reports           Results for orders placed during the hospital encounter of 17   MR-BRAIN-W/O    Impression MRI OF THE BRAIN WITHOUT CONTRAST WITHIN NORMAL LIMITS.                                 Results for orders placed in visit on 12/10/20   MR-LUMBAR SPINE-W/O        Results for orders placed during the hospital encounter of 11/07/10   MR-LUMBAR SPINE-WITH & W/O            Results for orders placed in visit on 12/10/20   MR-LUMBAR SPINE-W/O                                                                                                                                                                                                        Results for orders placed in visit on 12/10/20   DX-LUMBAR SPINE-4+ VIEWS                                         DIAGNOSIS   Visit Diagnoses     ICD-10-CM   1. Post laminectomy syndrome  M96.1   2. History of lumbosacral spine surgery done by Dr Yun  Z98.890   3. Chronic bilateral low back pain without sciatica  M54.5    G89.29   4. Lumbar spondylosis  M47.816   5. Closed fracture of one rib of left side, sequela, healed  S22.32XS         ASSESSMENT and PLAN:     Jovita Chaves  1945 female      Jovita was seen today for follow-up.    Diagnoses and all orders for this visit:    Post laminectomy syndrome  -     cephALEXin (KEFLEX) 500 MG Cap; Take 1 capsule by mouth 4 times a day for 8 days.  -     MR-THORACIC  SPINE-W/O; Future    History of lumbosacral spine surgery done by Dr Yun  -     cephALEXin (KEFLEX) 500 MG Cap; Take 1 capsule by mouth 4 times a day for 8 days.  -     MR-THORACIC SPINE-W/O; Future    Chronic bilateral low back pain without sciatica  -     cephALEXin (KEFLEX) 500 MG Cap; Take 1 capsule by mouth 4 times a day for 8 days.  -     MR-THORACIC SPINE-W/O; Future    Lumbar spondylosis  -     cephALEXin (KEFLEX) 500 MG Cap; Take 1 capsule by mouth 4 times a day for 8 days.  -     MR-THORACIC SPINE-W/O; Future    Closed fracture of one rib of left side, sequela, healed         The patient is failed multiple conservative treatments including diagnostic medial branch blocks, previous injections, surgery, physical therapy, medication management for greater than 6 months and has severe pain and functional deficits.      I reviewed the notes from psychology including from 2/15/1 from Dr. Wero Cartagena clearing the patient for any surgical intervention and given a greenlight.    The rib fracture has healed.    Proceed with spinal cord stimulation trials as scheduled.    Antibiotics for prophylactic reasons during the trial    MRI thoracic spine ordered to evaluate for safe passage of the spinal cord stimulation leads into the thoracic epidural space.    We discussed the nature of spinal cord stimulators as well as the trial.    Total time spent on the day of the encounter: 43 minutes.  This includes counseling, care coordination, medical records review.        Follow up: Approximately 1 week after the trial    Thank you for allowing me to participate in the care of this patient. If you have any questions please not hesitate to contact me.           Please note that this dictation was created using voice recognition software. I have made every reasonable attempt to correct obvious errors but there may be errors of grammar and content that I may have overlooked prior to finalization of this note.      Will  MD Taylor  Interventional Spine and Sports Physiatry  Physical Medicine and Rehabilitation  [facility]

## 2021-05-12 ENCOUNTER — HOSPITAL ENCOUNTER (OUTPATIENT)
Dept: RADIOLOGY | Facility: MEDICAL CENTER | Age: 76
End: 2021-05-12
Attending: PHYSICAL MEDICINE & REHABILITATION
Payer: MEDICARE

## 2021-05-12 DIAGNOSIS — Z98.890 HISTORY OF LUMBOSACRAL SPINE SURGERY: ICD-10-CM

## 2021-05-12 DIAGNOSIS — G89.29 CHRONIC BILATERAL LOW BACK PAIN WITHOUT SCIATICA: ICD-10-CM

## 2021-05-12 DIAGNOSIS — M47.816 LUMBAR SPONDYLOSIS: ICD-10-CM

## 2021-05-12 DIAGNOSIS — M54.50 CHRONIC BILATERAL LOW BACK PAIN WITHOUT SCIATICA: ICD-10-CM

## 2021-05-12 DIAGNOSIS — M96.1 POST LAMINECTOMY SYNDROME: ICD-10-CM

## 2021-05-12 PROCEDURE — 72146 MRI CHEST SPINE W/O DYE: CPT

## 2021-05-18 ENCOUNTER — TELEPHONE (OUTPATIENT)
Dept: PHYSICAL MEDICINE AND REHAB | Facility: MEDICAL CENTER | Age: 76
End: 2021-05-18

## 2021-05-18 NOTE — TELEPHONE ENCOUNTER
Left patient a message to confirm procedure w/ Dr. Vazquez on 5/26, asked for a call back to go over additional information.

## 2021-05-26 ENCOUNTER — APPOINTMENT (OUTPATIENT)
Dept: RADIOLOGY | Facility: REHABILITATION | Age: 76
End: 2021-05-26
Attending: PHYSICAL MEDICINE & REHABILITATION
Payer: MEDICARE

## 2021-05-26 ENCOUNTER — HOSPITAL ENCOUNTER (OUTPATIENT)
Facility: REHABILITATION | Age: 76
End: 2021-05-26
Attending: PHYSICAL MEDICINE & REHABILITATION | Admitting: PHYSICAL MEDICINE & REHABILITATION
Payer: MEDICARE

## 2021-05-26 VITALS
TEMPERATURE: 98 F | HEART RATE: 86 BPM | DIASTOLIC BLOOD PRESSURE: 76 MMHG | WEIGHT: 231.48 LBS | BODY MASS INDEX: 35.08 KG/M2 | HEIGHT: 68 IN | RESPIRATION RATE: 17 BRPM | OXYGEN SATURATION: 100 % | SYSTOLIC BLOOD PRESSURE: 122 MMHG

## 2021-05-26 PROCEDURE — 99152 MOD SED SAME PHYS/QHP 5/>YRS: CPT

## 2021-05-26 PROCEDURE — 99153 MOD SED SAME PHYS/QHP EA: CPT

## 2021-05-26 PROCEDURE — 63650 IMPLANT NEUROELECTRODES: CPT

## 2021-05-26 PROCEDURE — C1778 LEAD, NEUROSTIMULATOR: HCPCS

## 2021-05-26 PROCEDURE — 700111 HCHG RX REV CODE 636 W/ 250 OVERRIDE (IP)

## 2021-05-26 RX ORDER — CEFAZOLIN SODIUM 1 G/3ML
INJECTION, POWDER, FOR SOLUTION INTRAMUSCULAR; INTRAVENOUS
Status: COMPLETED
Start: 2021-05-26 | End: 2021-05-26

## 2021-05-26 RX ORDER — LIDOCAINE HYDROCHLORIDE 10 MG/ML
INJECTION, SOLUTION EPIDURAL; INFILTRATION; INTRACAUDAL; PERINEURAL
Status: COMPLETED
Start: 2021-05-26 | End: 2021-05-26

## 2021-05-26 RX ORDER — MIDAZOLAM HYDROCHLORIDE 1 MG/ML
INJECTION INTRAMUSCULAR; INTRAVENOUS
Status: COMPLETED
Start: 2021-05-26 | End: 2021-05-26

## 2021-05-26 RX ADMIN — MIDAZOLAM HYDROCHLORIDE 1 MG: 1 INJECTION, SOLUTION INTRAMUSCULAR; INTRAVENOUS at 13:14

## 2021-05-26 RX ADMIN — FENTANYL CITRATE 50 MCG: 50 INJECTION, SOLUTION INTRAMUSCULAR; INTRAVENOUS at 13:14

## 2021-05-26 RX ADMIN — CEFAZOLIN 2 G: 1 INJECTION, POWDER, FOR SOLUTION INTRAMUSCULAR; INTRAVENOUS at 12:55

## 2021-05-26 RX ADMIN — LIDOCAINE HYDROCHLORIDE 30 ML: 10 INJECTION, SOLUTION EPIDURAL; INFILTRATION; INTRACAUDAL; PERINEURAL at 13:17

## 2021-05-26 ASSESSMENT — PAIN DESCRIPTION - PAIN TYPE: TYPE: CHRONIC PAIN

## 2021-05-26 NOTE — NON-PROVIDER
Escorted  by RN  from procedure room ambulatory .Fluids and food tolerated well.. Reviewed  discharged home care  instruction given and understood by patient. Post procedure evaluated  by Dr. Vazquez . Post - procedure education instruction  given by MIRANDA Chase ( Med. Rep.) & understood by her.Meets criteria for discharged ambulatory  without difficulty or any deficits with  designated .

## 2021-05-26 NOTE — NON-PROVIDER
Verified patient name.Confirmed procedure, site and  consent signed. H&P updated. Reviewed medication allergies and current medication in epic. Printed home care discharged pre and post including infection  prevention verbal instruction given to patient and  she verbalized understanding.  Confirmed  waiting. Pertinent medical health  information reviewed in epic  ( arthritis, migraines, TIA, ) . Off Aspirin 81 for one week.  Patient denied taking blood thinner and anti-inflammatory medication. MIRANDA Chase ( Med. Rep.) pre-  procedure education  given she verbalized understanding.Dr. Vazquez spoke and examined patient.

## 2021-05-26 NOTE — PROGRESS NOTES
Preprocedure assessment completed.  Pertinent health information(Hx mgraines, TIAs 2017, arthritis) communicated to physician and staff prior to time out.  Patient positioned preprocedure by RN, CSTand XRAY.  Pillow under ankles for support.

## 2021-05-26 NOTE — OP REPORT
Date of Service: 5/26/2021    Physician/s: Will Vazquez MD    Pre-operative Diagnosis:   (M96.1) Post laminectomy syndrome  (M54.5,  G89.29) Chronic bilateral low back pain without sciatica  (Z98.890) History of lumbosacral spine surgery done by Dr Yun  (M47.816) Lumbar spondylosis  (R07.81) Rib pain on left side    Post-operative Diagnosis:   (M96.1) Post laminectomy syndrome  (M54.5,  G89.29) Chronic bilateral low back pain without sciatica  (Z98.890) History of lumbosacral spine surgery done by Dr Yun  (M47.816) Lumbar spondylosis  (R07.81) Rib pain on left side    Procedure: Spinal cord stimulator trial with two lead placement      Description of procedure:  The risks, benefits, and alternatives of the procedure were reviewed and discussed with the patient. Written informed consent was freely obtained. A pre-procedural time-out was conducted by the physician verifying patient’s identity, procedure to be performed, procedure site and side, and allergy verification. Appropriate equipment was determined to be in place for the procedure.     Perioperative antibiotics were given with 2g ancef    Moderation sedation was achieved with Versed (1mg) and Fentanyl (50mcg). Monitoring of the patients vital signs and respiratory status was provided by trained independent registered nurse during the entire course of the procedures and under my supervision and recoded in the patient’s medical record. The duration of sedation was over 10 minutes.     The patient's vital signs were carefully monitored before, throughout, and after the procedure.     In the fluoroscopy suite the patient was placed in a prone position, a pillow placed underneath their umbilicus. The fluoroscope was placed over the lumbar spine at the appropriate injection AP angle view, and the target for injection was marked. The skin was prepped and draped in the usual sterile fashion. A 25g needle was placed into the marked site, and approx 2cc of 1%  Lidocaine was injected subcutaneously into the epidermal and dermal layers. The needle was removed.     An 25g 3.5 inch  spinal needle was then placed and advanced under fluoroscopic guidance down to the level of the lamina, and 5cc of 1% Lidocaine was used to numb the needle tract. The needle was then removed intact.     A 14g introducer needle was then advanced into the T12-L1  interlaminar space utilizing the initial position AP view and a lateral view to ensure proper location of the needle tip at all times. A loss of resistance technique was used to guide the needle into the epidural space in a lateral fluoroscopic view.     The first lead was placed into the distal end of the introducer needle, and advanced into the epidural space under live fluoroscopy to ensure the needle was placed posteriorly in the epidural space. In the AP view, the lead was advanced up to the top of the T6 vertebral body - the lead was placed adjacent to the midline.  A second lead was attempted on the left using the same technique.  Epidural access could not be had on the left.  The introducer needle was placed on the right side adjacent to the first lead.  The needle was advanced to the epidural space under live fluoroscopy using a combination of AP, contralateral lateral views.  Loss of resistance technique was done.  The spinal cord stimulator lead was guided with live fluoroscopy to the superior aspect of the T6 vertebral body.     The leads were attached to the external battery and the patient was trialed to ensure effectiveness in the patient's painful area of her index pain.  Even at the inferior aspect of the lead coverage was above the area of the patient's maximal pain on the lumbar spine region.  The leads were retracted one half vertebral body to the middle of T6 vertebral body and testing was again done.  Coverage was still suboptimal.  The leads were retracted again to the superior aspect of the T7 vertebral body.   Intraoperative testing was again done.  Once optimal field coverage was obtained, the area was thoroughly cleaned with saline and dried immediately.     This was a challenging procedure given the patients Body mass index is 35.2 kg/m².  As well as significant scar tissue at the patient's previous spine surgery. This required longer needles.  A typical procedure such as this would require 4 inch introducers. This procedure required 5 inch introducers.  This added to the mental effort for complexity this procedure.  This also made acquiring fluoroscopic images more time-consuming and challenging.  Final fluoroscopic images were obtained and saved.     The leads were then carefully steri-stripped down and the leads were covered with tegaderm.    There were no complications noted. The patient was then evaluated post-procedure, and was hemodynamically stable prior to leaving the post-operative care unit. The patient will follow-up in approximately one week from now for further evaluation.        Will Vazquez MD  Physical Medicine and Rehabilitation  Interventional Spine and Sports Physiatry  [facility]       CPT  Fluoroscopic  needle guidance [id number]  Percutaneous implantation of neurostimulator electrode array, epidural 63650-22 x 2  moderate procedural sedation first 15 minutes:[id number]  moderate procedural sedation additional 15 minutes:99153 x 2

## 2021-05-26 NOTE — INTERVAL H&P NOTE
H&P reviewed. The patient was examined and there are no changes to the H&P     Lungs clear to auscultation bilaterally.  No abdominal tenderness.  Heart regular rate and rhythm.  Vitals reviewed.    Proceed with the originally scheduled procedure.  The risks, benefits and alternatives were discussed.  The patient understands these.    Pre-Op Diagnosis Codes:     * History of lumbosacral spine surgery [Z98.890]     * Chronic bilateral low back pain without sciatica [M54.5, G89.29]     * Lumbar spondylosis [M47.816]     * Post laminectomy syndrome [M96.1]    Procedure(s):  Spinal Cord stimulation trail with sedation  INSERTION, ELECTRODE ARRAY, NEUROSTIMULATOR, EPIDURAL    Will Vazquez MD  Physical Medicine and Rehabilitation  Interventional Spine and Sports Physiatry  [facility]

## 2021-05-27 ENCOUNTER — TELEPHONE (OUTPATIENT)
Dept: PHYSICAL MEDICINE AND REHAB | Facility: MEDICAL CENTER | Age: 76
End: 2021-05-27

## 2021-05-27 NOTE — TELEPHONE ENCOUNTER
Spoke to Pt in regards to her SP that was done with Dr. Vazquez dated 5/26/21 for her Spinal cord stimulation trial with sedation and she stated that she still a lot of pain on the insertion site and also her device got re progra, but still no release yet.    Advise to keep us posted in regards to her pain level.    Thank you  Uyen

## 2021-06-01 ENCOUNTER — TELEPHONE (OUTPATIENT)
Dept: PHYSICAL MEDICINE AND REHAB | Facility: MEDICAL CENTER | Age: 76
End: 2021-06-01

## 2021-06-02 ENCOUNTER — OFFICE VISIT (OUTPATIENT)
Dept: PHYSICAL MEDICINE AND REHAB | Facility: MEDICAL CENTER | Age: 76
End: 2021-06-02
Payer: MEDICARE

## 2021-06-02 VITALS
DIASTOLIC BLOOD PRESSURE: 68 MMHG | SYSTOLIC BLOOD PRESSURE: 112 MMHG | HEART RATE: 107 BPM | OXYGEN SATURATION: 96 % | TEMPERATURE: 97.8 F | HEIGHT: 68 IN | BODY MASS INDEX: 35.85 KG/M2 | WEIGHT: 236.55 LBS

## 2021-06-02 DIAGNOSIS — M54.50 CHRONIC BILATERAL LOW BACK PAIN WITHOUT SCIATICA: ICD-10-CM

## 2021-06-02 DIAGNOSIS — S22.32XS CLOSED FRACTURE OF ONE RIB OF LEFT SIDE, SEQUELA: ICD-10-CM

## 2021-06-02 DIAGNOSIS — M47.816 LUMBAR SPONDYLOSIS: ICD-10-CM

## 2021-06-02 DIAGNOSIS — Z98.890 HISTORY OF LUMBOSACRAL SPINE SURGERY: ICD-10-CM

## 2021-06-02 DIAGNOSIS — M96.1 POST LAMINECTOMY SYNDROME: ICD-10-CM

## 2021-06-02 DIAGNOSIS — R60.0 BILATERAL LOWER EXTREMITY EDEMA: ICD-10-CM

## 2021-06-02 DIAGNOSIS — G89.29 CHRONIC BILATERAL LOW BACK PAIN WITHOUT SCIATICA: ICD-10-CM

## 2021-06-02 PROCEDURE — 99214 OFFICE O/P EST MOD 30 MIN: CPT | Performed by: PHYSICAL MEDICINE & REHABILITATION

## 2021-06-02 ASSESSMENT — PAIN SCALES - GENERAL: PAINLEVEL: 9=SEVERE PAIN

## 2021-06-02 ASSESSMENT — PATIENT HEALTH QUESTIONNAIRE - PHQ9: CLINICAL INTERPRETATION OF PHQ2 SCORE: 0

## 2021-06-02 NOTE — PROGRESS NOTES
Follow up patient note  Interventional spine and sports physiatry, Physical medicine rehabilitation      Chief complaint:   Chief Complaint   Patient presents with   • Follow-Up     Back pain          HISTORY    Please see new patient note by Dr Vazquez,  for more details.     HPI  Patient identification: Jovita Chaves ,  1945,   With Diagnoses of Post laminectomy syndrome, History of lumbosacral spine surgery done by Dr Yun, Chronic bilateral low back pain without sciatica, Lumbar spondylosis, and Closed fracture of one rib of left side, sequela, healed were pertinent to this visit.     Procedures:  2021 diagnostic medial branch blocks targeting the bilateral L4-5 and L5-S1 facet joints.  Initially post procedure the patient had 100% pain relief.  By the time the patient went home there was minimal pain relief.  This will be called a negative block  2021 Medial Branch Blocks targeting the left L3-4, L4-5 and L5-S1  facet joints with 100% pain relief minutes post procedure however during the diagnostic phase the pain returned back to pre procedure pain with walking, standing and ADLs. This will be called a negative block.   2021 spinal cord stimulation trial with no significant improvement in pain and function       The patient had a spinal cord stimulator trial which I performed previously and unfortunately she had no significant improvement in her pain and function.  She continues to have 9 out of 10 low back pain rarely radiating.  Worse with bending lifting twisting.  Also pain with sitting.  Failed conservative treatments and home exercise program medication management to the procedure as above.    She has no rib pain and her rib fracture is healed.     ROS Red Flags :   Fever, Chills, Sweats: Denies  Involuntary Weight Loss: Denies  Bowel/Bladder Incontinence: Denies  Saddle Anesthesia: Denies        PMHx:   Past Medical History:   Diagnosis Date   • Arthritis     lower back    • Cervicalgia    • Hypothyroid    • Migraine without aura, with intractable migraine, so stated, without mention of status migrainosus    • Obesity    • Pain     lower back 8/10   • Thoracic or lumbosacral neuritis or radiculitis, unspecified     Bilateral L4/5   • Unspecified disorder of thyroid        PSHx:   Past Surgical History:   Procedure Laterality Date   • FLOURO GUIDE NEEDLE PLACEMENT Bilateral 5/26/2021    Procedure: Spinal Cord stimulation trail with sedation;  Surgeon: Will Vazquez M.D.;  [facility];  Service: Pain Management   • IMPLANT NEUROSTIM EPI ARRAY Bilateral 5/26/2021    Procedure: INSERTION, ELECTRODE ARRAY, NEUROSTIMULATOR, EPIDURAL;  Surgeon: Will Vazquez M.D.;  Location: [facility];  Service: Pain Management   • LUMBAR MEDIAL BRANCH BLOCKS Left 2/9/2021    Procedure: BLOCK, NERVE, SPINAL, LUMBAR, POSTERIOR RAMUS, MEDIAL BRANCH;  Surgeon: Will Vazquez M.D.;  Location: [facility];  Service: Pain Management   • LUMBAR MEDIAL BRANCH BLOCKS Bilateral 1/4/2021    Procedure: BLOCK, NERVE, SPINAL, LUMBAR, POSTERIOR RAMUS, MEDIAL BRANCH;  Surgeon: Will Vazquez M.D.;  [facility];  Service: Pain Management   • IRRIGATION & DEBRIDEMENT NEURO  11/8/2010    Performed by JANET FELIX at [facility]   • LUMBAR LAMINECTOMY DISKECTOMY  9/27/2010    Performed by JANET FELIX at SUR[facility]   • LAMINOTOMY  9/27/2010    Performed by JANET FELIX at [facility]ERY [facility]   • OTHER  2010    epidural injections   • OTHER  2009    left ankle debridement   • OTHER  2006    right foot gusman osteotomy   • OTHER  2005    tendon transfer right foot   • CHOLECYSTECTOMY  1983   • CHOLECYSTECTOMY  1983   • OTHER  1982    left knee repair   • OTHER  1951    hysterectomy   • OTHER ORTHOPEDIC SURGERY      Left knee, right foot tendon, Left ankle debridement   • ROTATOR CUFF  REPAIR  [id number]    rotator cuff, brachioplexus nerve       Family history   Family History   Problem Relation Age of Onset   • Cancer Mother         Colon   • Thyroid Mother         Hypothyroid   • Other Father         Migraine         Medications:   Outpatient Medications Marked as Taking for the 6/2/21 encounter (Office Visit) with Will Vazquez M.D.   Medication Sig Dispense Refill   • aspirin EC (ECOTRIN) 81 MG Tablet Delayed Response Take 81 mg by mouth every day.     • divalproex (DEPAKOTE) 250 MG Tablet Delayed Response      • rosuvastatin (CRESTOR) 10 MG Tab      • Omega-3 Fatty Acids (RA FISH OIL) 1400 MG CAPSULE DELAYED RELEASE Take 2 Caps by mouth every day. 60 Cap 3   • Cholecalciferol (VITAMIN D3) 5000 units Cap Take 1 Cap by mouth every day.     • sumatriptan (IMITREX) 100 MG tablet Take 1 Tab by mouth. 1 tab at headache onset; repeat in 1 hour prn. 9 Tab 6        Current Outpatient Medications on File Prior to Visit   Medication Sig Dispense Refill   • aspirin EC (ECOTRIN) 81 MG Tablet Delayed Response Take 81 mg by mouth every day.     • divalproex (DEPAKOTE) 250 MG Tablet Delayed Response      • rosuvastatin (CRESTOR) 10 MG Tab      • Omega-3 Fatty Acids (RA FISH OIL) 1400 MG CAPSULE DELAYED RELEASE Take 2 Caps by mouth every day. 60 Cap 3   • Cholecalciferol (VITAMIN D3) 5000 units Cap Take 1 Cap by mouth every day.     • sumatriptan (IMITREX) 100 MG tablet Take 1 Tab by mouth. 1 tab at headache onset; repeat in 1 hour prn. 9 Tab 6   • levothyroxine (SYNTHROID) 125 MCG Tab Take 125 mcg by mouth Every morning on an empty stomach. (Patient not taking: Reported on 6/2/2021)       No current facility-administered medications on file prior to visit.         Allergies:   No Known Allergies    Social Hx:   Social History     Socioeconomic History   • Marital status:      Spouse name: Not on file   • Number of children: Not on file   • Years of education: Not on file   • Highest education  "level: Not on file   Occupational History   • Not on file   Tobacco Use   • Smoking status: Never Smoker   • Smokeless tobacco: Never Used   Substance and Sexual Activity   • Alcohol use: No   • Drug use: No   • Sexual activity: Not on file   Other Topics Concern   •  Service No   • Blood Transfusions Yes   • Caffeine Concern No   • Occupational Exposure No   • Hobby Hazards No   • Sleep Concern No   • Stress Concern No   • Weight Concern No   • Special Diet No   • Back Care No   • Exercise No   • Bike Helmet No   • Seat Belt Yes   • Self-Exams No   Social History Narrative   • Not on file     Social Determinants of Health     Financial Resource Strain:    • Difficulty of Paying Living Expenses:    Food Insecurity:    • Worried About Running Out of Food in the Last Year:    • Ran Out of Food in the Last Year:    Transportation Needs:    • Lack of Transportation (Medical):    • Lack of Transportation (Non-Medical):    Physical Activity:    • Days of Exercise per Week:    • Minutes of Exercise per Session:    Stress:    • Feeling of Stress :    Social Connections:    • Frequency of Communication with Friends and Family:    • Frequency of Social Gatherings with Friends and Family:    • Attends [employer] Services:    • Active Member of Clubs or Organizations:    • Attends Club or Organization Meetings:    • Marital Status:    Intimate Partner Violence:    • Fear of Current or Ex-Partner:    • Emotionally Abused:    • Physically Abused:    • Sexually Abused:          EXAMINATION     Physical Exam:   Vitals: /68 (BP Location: Right arm, Patient Position: Sitting, BP Cuff Size: Adult)   Pulse (!) 107   Temp 36.6 °C (97.8 °F) (Temporal)   Ht 1.727 m (5' 8\")   Wt 107 kg (236 lb 8.9 oz)   SpO2 96%     Constitutional:   Body Habitus: Body mass index is 35.97 kg/m².  Cooperation: Fully cooperates with exam  Appearance: Well-groomed no disheveled    Respiratory-  breathing comfortable on room air, no audible " wheezing  Cardiovascular- capillary refills less than 2 seconds. No lower extremity edema is noted.   Psychiatric- alert and oriented ×3. Normal affect.    MSK and Neuro: -    Strength is 5 out of 5 throughout the bilateral lower extremities.  Sensation is intact.  There are no signs of infection over the procedure site.  Patient does have 1+ pitting edema to the medial malleolus bilaterally on the lower extremities.  There is no asymmetry, no unilateral swelling, no discoloration of the skin.        MEDICAL DECISION MAKING    DATA    Labs:   Lab Results   Component Value Date/Time    SODIUM 137 12/01/2017 03:08 PM    POTASSIUM 3.5 (L) 12/01/2017 03:08 PM    CHLORIDE 103 12/01/2017 03:08 PM    CO2 24 12/01/2017 03:08 PM    GLUCOSE 104 (H) 12/01/2017 03:08 PM    BUN 19 12/01/2017 03:08 PM    CREATININE 0.85 12/01/2017 03:08 PM        Lab Results   Component Value Date/Time    PROTHROMBTM 12.5 12/01/2017 03:08 PM    INR 0.97 12/01/2017 03:08 PM        Lab Results   Component Value Date/Time    WBC 6.9 12/01/2017 03:08 PM    RBC 4.50 12/01/2017 03:08 PM    HEMOGLOBIN 14.3 12/01/2017 03:08 PM    HEMATOCRIT 42.3 12/01/2017 03:08 PM    MCV 94.0 12/01/2017 03:08 PM    MCH 31.8 12/01/2017 03:08 PM    MCHC 33.8 12/01/2017 03:08 PM    MPV 10.6 12/01/2017 03:08 PM    NEUTSPOLYS 54.90 12/01/2017 03:08 PM    LYMPHOCYTES 33.00 12/01/2017 03:08 PM    MONOCYTES 8.70 12/01/2017 03:08 PM    EOSINOPHILS 2.50 12/01/2017 03:08 PM    BASOPHILS 0.60 12/01/2017 03:08 PM        Lab Results   Component Value Date/Time    HBA1C 5.6 12/01/2017 03:28 PM          Imaging:   I personally reviewed following images    I reviewed the fluoroscopic images from 1/4/2021 showing successful diagnostic medial branch blocks targeting the bilateral L4-5 and L5-S1 facet joints.  I reviewed these with the patient at the visit on 1/27/2021.    I reviewed the following radiology reports           Results for orders placed during the hospital encounter of  17   MR-BRAIN-W/O    Impression MRI OF THE BRAIN WITHOUT CONTRAST WITHIN NORMAL LIMITS.                                 Results for orders placed in visit on 12/10/20   MR-LUMBAR SPINE-W/O        Results for orders placed during the hospital encounter of 11/07/10   MR-LUMBAR SPINE-WITH & W/O            Results for orders placed in visit on 12/10/20   MR-LUMBAR SPINE-W/O                                                                                                                                                                                                        Results for orders placed in visit on 12/10/20   DX-LUMBAR SPINE-4+ VIEWS                                         DIAGNOSIS   Visit Diagnoses     ICD-10-CM   1. Post laminectomy syndrome  M96.1   2. History of lumbosacral spine surgery done by Dr Yun  Z98.890   3. Chronic bilateral low back pain without sciatica  M54.5    G89.29   4. Lumbar spondylosis  M47.816   5. Closed fracture of one rib of left side, sequela, healed  S22.32XS         ASSESSMENT and PLAN:     Jovita Chaves  1945 female      Jovita was seen today for follow-up.    Diagnoses and all orders for this visit:    Post laminectomy syndrome    History of lumbosacral spine surgery done by Dr Yun    Chronic bilateral low back pain without sciatica    Lumbar spondylosis    Closed fracture of one rib of left side, sequela, healed       We discussed emergency precautions.  There are no signs of DVT.    I reviewed the notes from psychology including from 2/15/1 from Dr. Wero Cartagena clearing the patient for any surgical intervention and given a greenlight.    Negative spinal cord stimulator trial.  I do not recommend proceeding with permanent placement.    The patient reports having a consult with Dr. Yun to discuss next steps.  I also discussed with the patient the intercept procedure and she is interested in this.  I provided the patient with literature and she  signed the consent form for intercept.    Total time spent on the day of the encounter: 31 minutes.  This includes counseling, care coordination, medical records review.        Follow up: After the above consults    Thank you for allowing me to participate in the care of this patient. If you have any questions please not hesitate to contact me.           Please note that this dictation was created using voice recognition software. I have made every reasonable attempt to correct obvious errors but there may be errors of grammar and content that I may have overlooked prior to finalization of this note.      Will Vazquez MD  Interventional Spine and Sports Physiatry  Physical Medicine and Rehabilitation  [facility]

## 2022-07-28 ENCOUNTER — HOSPITAL ENCOUNTER (OUTPATIENT)
Dept: RADIOLOGY | Facility: MEDICAL CENTER | Age: 77
End: 2022-07-28
Attending: NURSE PRACTITIONER
Payer: MEDICARE

## 2022-07-28 DIAGNOSIS — M47.816 LUMBAR SPONDYLOSIS: ICD-10-CM

## 2022-07-28 DIAGNOSIS — M25.551 RIGHT HIP PAIN: ICD-10-CM

## 2022-07-28 PROCEDURE — 73522 X-RAY EXAM HIPS BI 3-4 VIEWS: CPT

## 2022-07-28 PROCEDURE — 72110 X-RAY EXAM L-2 SPINE 4/>VWS: CPT

## 2022-09-07 ENCOUNTER — HOSPITAL ENCOUNTER (OUTPATIENT)
Dept: RADIOLOGY | Facility: MEDICAL CENTER | Age: 77
End: 2022-09-07
Attending: NEUROLOGICAL SURGERY
Payer: MEDICARE

## 2022-09-07 DIAGNOSIS — M54.50 LOW BACK PAIN, UNSPECIFIED BACK PAIN LATERALITY, UNSPECIFIED CHRONICITY, UNSPECIFIED WHETHER SCIATICA PRESENT: ICD-10-CM

## 2022-09-08 ENCOUNTER — HOSPITAL ENCOUNTER (OUTPATIENT)
Dept: RADIOLOGY | Facility: MEDICAL CENTER | Age: 77
End: 2022-09-08
Attending: NEUROLOGICAL SURGERY
Payer: MEDICARE

## 2022-09-08 PROCEDURE — 72148 MRI LUMBAR SPINE W/O DYE: CPT

## 2022-10-12 ENCOUNTER — HOSPITAL ENCOUNTER (OUTPATIENT)
Dept: RADIOLOGY | Facility: MEDICAL CENTER | Age: 77
End: 2022-10-12
Attending: NEUROLOGICAL SURGERY
Payer: MEDICARE

## 2022-10-12 DIAGNOSIS — M41.9 SCOLIOSIS, UNSPECIFIED SCOLIOSIS TYPE, UNSPECIFIED SPINAL REGION: ICD-10-CM

## 2022-10-12 PROCEDURE — 72081 X-RAY EXAM ENTIRE SPI 1 VW: CPT

## 2022-10-20 ENCOUNTER — HOSPITAL ENCOUNTER (OUTPATIENT)
Dept: RADIOLOGY | Facility: MEDICAL CENTER | Age: 77
End: 2022-10-20
Attending: STUDENT IN AN ORGANIZED HEALTH CARE EDUCATION/TRAINING PROGRAM
Payer: MEDICARE

## 2022-10-20 DIAGNOSIS — S34.129A: ICD-10-CM

## 2022-10-20 PROCEDURE — A9503 TC99M MEDRONATE: HCPCS

## 2022-10-31 ENCOUNTER — HOSPITAL ENCOUNTER (OUTPATIENT)
Dept: RADIOLOGY | Facility: MEDICAL CENTER | Age: 77
End: 2022-10-31
Attending: NEUROLOGICAL SURGERY
Payer: MEDICARE

## 2022-10-31 DIAGNOSIS — S24.119A: ICD-10-CM

## 2022-10-31 PROCEDURE — 72128 CT CHEST SPINE W/O DYE: CPT

## 2022-11-03 ENCOUNTER — PATIENT MESSAGE (OUTPATIENT)
Dept: HEALTH INFORMATION MANAGEMENT | Facility: OTHER | Age: 77
End: 2022-11-03

## 2022-12-08 ENCOUNTER — APPOINTMENT (OUTPATIENT)
Dept: RADIOLOGY | Facility: MEDICAL CENTER | Age: 77
End: 2022-12-08
Attending: NURSE PRACTITIONER
Payer: MEDICARE

## 2022-12-08 DIAGNOSIS — M50.00 INTERVERTEBRAL CERVICAL DISC DISORDER WITH MYELOPATHY, CERVICAL REGION: ICD-10-CM

## 2022-12-08 PROCEDURE — 72050 X-RAY EXAM NECK SPINE 4/5VWS: CPT

## 2022-12-13 ENCOUNTER — HOSPITAL ENCOUNTER (OUTPATIENT)
Dept: RADIOLOGY | Facility: MEDICAL CENTER | Age: 77
End: 2022-12-13
Attending: NURSE PRACTITIONER
Payer: MEDICARE

## 2022-12-13 DIAGNOSIS — M50.00 INTERVERTEBRAL CERVICAL DISC DISORDER WITH MYELOPATHY, CERVICAL REGION: ICD-10-CM

## 2022-12-13 PROCEDURE — 72141 MRI NECK SPINE W/O DYE: CPT

## 2023-04-22 ENCOUNTER — HOSPITAL ENCOUNTER (OUTPATIENT)
Dept: RADIOLOGY | Facility: MEDICAL CENTER | Age: 78
End: 2023-04-22
Attending: PHYSICIAN ASSISTANT
Payer: MEDICARE

## 2023-04-22 DIAGNOSIS — M40.204 KYPHOSIS OF THORACIC REGION, UNSPECIFIED KYPHOSIS TYPE: ICD-10-CM

## 2023-04-22 PROCEDURE — 72157 MRI CHEST SPINE W/O & W/DYE: CPT

## 2023-04-22 PROCEDURE — 700117 HCHG RX CONTRAST REV CODE 255: Performed by: PHYSICIAN ASSISTANT

## 2023-04-22 PROCEDURE — A9579 GAD-BASE MR CONTRAST NOS,1ML: HCPCS | Performed by: PHYSICIAN ASSISTANT

## 2023-04-22 RX ADMIN — GADOTERIDOL 20 ML: 279.3 INJECTION, SOLUTION INTRAVENOUS at 08:19

## 2023-04-24 RX ORDER — HEPARIN SODIUM 1000 [USP'U]/ML
INJECTION, SOLUTION INTRAVENOUS; SUBCUTANEOUS
Status: DISPENSED
Start: 2023-04-24 | End: 2023-04-24

## 2023-11-29 ENCOUNTER — PATIENT MESSAGE (OUTPATIENT)
Dept: HEALTH INFORMATION MANAGEMENT | Facility: OTHER | Age: 78
End: 2023-11-29

## 2025-01-09 ENCOUNTER — HOSPITAL ENCOUNTER (OUTPATIENT)
Dept: RADIOLOGY | Facility: MEDICAL CENTER | Age: 80
End: 2025-01-09
Attending: PHYSICIAN ASSISTANT
Payer: MEDICARE

## 2025-01-09 DIAGNOSIS — M54.16 RADICULOPATHY OF LUMBAR REGION: ICD-10-CM

## 2025-01-09 PROCEDURE — 72148 MRI LUMBAR SPINE W/O DYE: CPT

## 2025-03-19 ENCOUNTER — HOSPITAL ENCOUNTER (OUTPATIENT)
Dept: CARDIOLOGY | Facility: MEDICAL CENTER | Age: 80
End: 2025-03-19
Attending: NEUROLOGICAL SURGERY
Payer: MEDICARE

## 2025-03-19 ENCOUNTER — HOSPITAL ENCOUNTER (OUTPATIENT)
Dept: LAB | Facility: MEDICAL CENTER | Age: 80
End: 2025-03-19
Attending: NEUROLOGICAL SURGERY
Payer: MEDICARE

## 2025-03-19 ENCOUNTER — HOSPITAL ENCOUNTER (OUTPATIENT)
Dept: RADIOLOGY | Facility: MEDICAL CENTER | Age: 80
End: 2025-03-19
Attending: NEUROLOGICAL SURGERY
Payer: MEDICARE

## 2025-03-19 DIAGNOSIS — R79.1 ABNORMAL COAGULATION PROFILE: ICD-10-CM

## 2025-03-19 DIAGNOSIS — Z01.810 PRE-OPERATIVE CARDIOVASCULAR EXAMINATION: ICD-10-CM

## 2025-03-19 DIAGNOSIS — Z01.811 PRE-OPERATIVE RESPIRATORY EXAMINATION: ICD-10-CM

## 2025-03-19 DIAGNOSIS — M54.16 LUMBAR RADICULOPATHY: ICD-10-CM

## 2025-03-19 DIAGNOSIS — Z01.812 PRE-OPERATIVE LABORATORY EXAMINATION: ICD-10-CM

## 2025-03-19 DIAGNOSIS — R82.90 NONSPECIFIC FINDING ON EXAMINATION OF URINE: ICD-10-CM

## 2025-03-19 DIAGNOSIS — R94.31 NONSPECIFIC ABNORMAL ELECTROCARDIOGRAM (ECG) (EKG): ICD-10-CM

## 2025-03-19 LAB
ANION GAP SERPL CALC-SCNC: 9 MMOL/L (ref 7–16)
APPEARANCE UR: CLEAR
BACTERIA #/AREA URNS HPF: ABNORMAL /HPF
BASOPHILS # BLD AUTO: 0.5 % (ref 0–1.8)
BASOPHILS # BLD: 0.03 K/UL (ref 0–0.12)
BILIRUB UR QL STRIP.AUTO: NEGATIVE
BUN SERPL-MCNC: 19 MG/DL (ref 8–22)
CA OXALATE CRYSTAL  1765: PRESENT /HPF
CALCIUM SERPL-MCNC: 10 MG/DL (ref 8.5–10.5)
CASTS URNS QL MICRO: ABNORMAL /LPF (ref 0–2)
CHLORIDE SERPL-SCNC: 95 MMOL/L (ref 96–112)
CO2 SERPL-SCNC: 29 MMOL/L (ref 20–33)
COLOR UR: ABNORMAL
CREAT SERPL-MCNC: 0.78 MG/DL (ref 0.5–1.4)
EKG IMPRESSION: NORMAL
EOSINOPHIL # BLD AUTO: 0.04 K/UL (ref 0–0.51)
EOSINOPHIL NFR BLD: 0.6 % (ref 0–6.9)
EPITHELIAL CELLS 1715: ABNORMAL /HPF (ref 0–5)
ERYTHROCYTE [DISTWIDTH] IN BLOOD BY AUTOMATED COUNT: 47.7 FL (ref 35.9–50)
GFR SERPLBLD CREATININE-BSD FMLA CKD-EPI: 78 ML/MIN/1.73 M 2
GLUCOSE SERPL-MCNC: 88 MG/DL (ref 65–99)
GLUCOSE UR STRIP.AUTO-MCNC: NEGATIVE MG/DL
HCT VFR BLD AUTO: 42.4 % (ref 37–47)
HGB BLD-MCNC: 14 G/DL (ref 12–16)
HYALINE CAST   1831: PRESENT /LPF
IMM GRANULOCYTES # BLD AUTO: 0.02 K/UL (ref 0–0.11)
IMM GRANULOCYTES NFR BLD AUTO: 0.3 % (ref 0–0.9)
KETONES UR STRIP.AUTO-MCNC: ABNORMAL MG/DL
LEUKOCYTE ESTERASE UR QL STRIP.AUTO: ABNORMAL
LYMPHOCYTES # BLD AUTO: 1.79 K/UL (ref 1–4.8)
LYMPHOCYTES NFR BLD: 28.7 % (ref 22–41)
MCH RBC QN AUTO: 31 PG (ref 27–33)
MCHC RBC AUTO-ENTMCNC: 33 G/DL (ref 32.2–35.5)
MCV RBC AUTO: 93.8 FL (ref 81.4–97.8)
MICRO URNS: ABNORMAL
MONOCYTES # BLD AUTO: 0.73 K/UL (ref 0–0.85)
MONOCYTES NFR BLD AUTO: 11.7 % (ref 0–13.4)
MUCOUS THREADS URNS QL MICRO: PRESENT /HPF
NEUTROPHILS # BLD AUTO: 3.62 K/UL (ref 1.82–7.42)
NEUTROPHILS NFR BLD: 58.2 % (ref 44–72)
NITRITE UR QL STRIP.AUTO: NEGATIVE
NRBC # BLD AUTO: 0 K/UL
NRBC BLD-RTO: 0 /100 WBC (ref 0–0.2)
PH UR STRIP.AUTO: 6 [PH] (ref 5–8)
PLATELET # BLD AUTO: 170 K/UL (ref 164–446)
PMV BLD AUTO: 10.3 FL (ref 9–12.9)
POTASSIUM SERPL-SCNC: 4.6 MMOL/L (ref 3.6–5.5)
PROT UR QL STRIP: 30 MG/DL
RBC # BLD AUTO: 4.52 M/UL (ref 4.2–5.4)
RBC # URNS HPF: ABNORMAL /HPF (ref 0–2)
RBC UR QL AUTO: NEGATIVE
SODIUM SERPL-SCNC: 133 MMOL/L (ref 135–145)
SP GR UR STRIP.AUTO: 1.03
UROBILINOGEN UR STRIP.AUTO-MCNC: 1 EU/DL
WBC # BLD AUTO: 6.2 K/UL (ref 4.8–10.8)
WBC #/AREA URNS HPF: ABNORMAL /HPF

## 2025-03-19 PROCEDURE — 81001 URINALYSIS AUTO W/SCOPE: CPT

## 2025-03-19 PROCEDURE — 80048 BASIC METABOLIC PNL TOTAL CA: CPT

## 2025-03-19 PROCEDURE — 85025 COMPLETE CBC W/AUTO DIFF WBC: CPT

## 2025-03-19 PROCEDURE — 36415 COLL VENOUS BLD VENIPUNCTURE: CPT

## 2025-03-19 PROCEDURE — 71046 X-RAY EXAM CHEST 2 VIEWS: CPT

## 2025-03-19 PROCEDURE — 93010 ELECTROCARDIOGRAM REPORT: CPT | Performed by: INTERNAL MEDICINE

## 2025-03-19 PROCEDURE — 85730 THROMBOPLASTIN TIME PARTIAL: CPT

## 2025-03-19 PROCEDURE — 93005 ELECTROCARDIOGRAM TRACING: CPT | Mod: TC | Performed by: STUDENT IN AN ORGANIZED HEALTH CARE EDUCATION/TRAINING PROGRAM

## 2025-03-19 PROCEDURE — 85610 PROTHROMBIN TIME: CPT

## 2025-03-20 LAB
APTT PPP: 31.2 SEC (ref 24.7–36)
INR PPP: 1.22 (ref 0.87–1.13)
PROTHROMBIN TIME: 15.5 SEC (ref 12–14.6)